# Patient Record
Sex: FEMALE | Race: WHITE | NOT HISPANIC OR LATINO
[De-identification: names, ages, dates, MRNs, and addresses within clinical notes are randomized per-mention and may not be internally consistent; named-entity substitution may affect disease eponyms.]

---

## 2017-06-02 ENCOUNTER — APPOINTMENT (OUTPATIENT)
Dept: CT IMAGING | Facility: CLINIC | Age: 62
End: 2017-06-02

## 2017-06-02 ENCOUNTER — OUTPATIENT (OUTPATIENT)
Dept: OUTPATIENT SERVICES | Facility: HOSPITAL | Age: 62
LOS: 1 days | End: 2017-06-02
Payer: COMMERCIAL

## 2017-06-02 DIAGNOSIS — Z00.8 ENCOUNTER FOR OTHER GENERAL EXAMINATION: ICD-10-CM

## 2017-06-02 DIAGNOSIS — Z90.710 ACQUIRED ABSENCE OF BOTH CERVIX AND UTERUS: Chronic | ICD-10-CM

## 2017-06-02 DIAGNOSIS — Z98.89 OTHER SPECIFIED POSTPROCEDURAL STATES: Chronic | ICD-10-CM

## 2017-06-02 PROCEDURE — 74178 CT ABD&PLV WO CNTR FLWD CNTR: CPT

## 2017-06-02 PROCEDURE — 82565 ASSAY OF CREATININE: CPT

## 2017-07-21 ENCOUNTER — APPOINTMENT (OUTPATIENT)
Dept: MRI IMAGING | Facility: CLINIC | Age: 62
End: 2017-07-21

## 2017-07-21 ENCOUNTER — OUTPATIENT (OUTPATIENT)
Dept: OUTPATIENT SERVICES | Facility: HOSPITAL | Age: 62
LOS: 1 days | End: 2017-07-21
Payer: COMMERCIAL

## 2017-07-21 DIAGNOSIS — R10.11 RIGHT UPPER QUADRANT PAIN: ICD-10-CM

## 2017-07-21 DIAGNOSIS — Z98.89 OTHER SPECIFIED POSTPROCEDURAL STATES: Chronic | ICD-10-CM

## 2017-07-21 DIAGNOSIS — R11.2 NAUSEA WITH VOMITING, UNSPECIFIED: ICD-10-CM

## 2017-07-21 DIAGNOSIS — Z90.710 ACQUIRED ABSENCE OF BOTH CERVIX AND UTERUS: Chronic | ICD-10-CM

## 2017-07-21 DIAGNOSIS — Z00.8 ENCOUNTER FOR OTHER GENERAL EXAMINATION: ICD-10-CM

## 2017-07-21 PROCEDURE — 74183 MRI ABD W/O CNTR FLWD CNTR: CPT

## 2017-07-21 PROCEDURE — 72197 MRI PELVIS W/O & W/DYE: CPT

## 2017-07-21 PROCEDURE — 82565 ASSAY OF CREATININE: CPT

## 2017-07-21 PROCEDURE — A9585: CPT

## 2017-08-24 ENCOUNTER — APPOINTMENT (OUTPATIENT)
Dept: MAMMOGRAPHY | Facility: CLINIC | Age: 62
End: 2017-08-24
Payer: COMMERCIAL

## 2017-08-24 ENCOUNTER — APPOINTMENT (OUTPATIENT)
Dept: RADIOLOGY | Facility: CLINIC | Age: 62
End: 2017-08-24
Payer: COMMERCIAL

## 2017-08-24 ENCOUNTER — OUTPATIENT (OUTPATIENT)
Dept: OUTPATIENT SERVICES | Facility: HOSPITAL | Age: 62
LOS: 1 days | End: 2017-08-24
Payer: COMMERCIAL

## 2017-08-24 DIAGNOSIS — Z98.89 OTHER SPECIFIED POSTPROCEDURAL STATES: Chronic | ICD-10-CM

## 2017-08-24 DIAGNOSIS — Z90.710 ACQUIRED ABSENCE OF BOTH CERVIX AND UTERUS: Chronic | ICD-10-CM

## 2017-08-24 DIAGNOSIS — Z00.8 ENCOUNTER FOR OTHER GENERAL EXAMINATION: ICD-10-CM

## 2017-08-24 PROCEDURE — G0202: CPT | Mod: 26

## 2017-08-24 PROCEDURE — 77067 SCR MAMMO BI INCL CAD: CPT

## 2017-08-24 PROCEDURE — 77080 DXA BONE DENSITY AXIAL: CPT

## 2017-08-24 PROCEDURE — 77063 BREAST TOMOSYNTHESIS BI: CPT | Mod: 26

## 2017-08-24 PROCEDURE — 77080 DXA BONE DENSITY AXIAL: CPT | Mod: 26

## 2017-08-24 PROCEDURE — 77063 BREAST TOMOSYNTHESIS BI: CPT

## 2018-02-26 ENCOUNTER — RESULT REVIEW (OUTPATIENT)
Age: 63
End: 2018-02-26

## 2018-02-26 ENCOUNTER — OUTPATIENT (OUTPATIENT)
Dept: OUTPATIENT SERVICES | Facility: HOSPITAL | Age: 63
LOS: 1 days | End: 2018-02-26
Payer: COMMERCIAL

## 2018-02-26 ENCOUNTER — TRANSCRIPTION ENCOUNTER (OUTPATIENT)
Age: 63
End: 2018-02-26

## 2018-02-26 DIAGNOSIS — Z98.89 OTHER SPECIFIED POSTPROCEDURAL STATES: Chronic | ICD-10-CM

## 2018-02-26 DIAGNOSIS — Z90.710 ACQUIRED ABSENCE OF BOTH CERVIX AND UTERUS: Chronic | ICD-10-CM

## 2018-02-26 DIAGNOSIS — Z12.11 ENCOUNTER FOR SCREENING FOR MALIGNANT NEOPLASM OF COLON: ICD-10-CM

## 2018-02-26 DIAGNOSIS — R13.10 DYSPHAGIA, UNSPECIFIED: ICD-10-CM

## 2018-02-26 PROCEDURE — 88312 SPECIAL STAINS GROUP 1: CPT | Mod: 26

## 2018-02-26 PROCEDURE — 88305 TISSUE EXAM BY PATHOLOGIST: CPT

## 2018-02-26 PROCEDURE — 45380 COLONOSCOPY AND BIOPSY: CPT | Mod: PT

## 2018-02-26 PROCEDURE — 88312 SPECIAL STAINS GROUP 1: CPT

## 2018-02-26 PROCEDURE — 43239 EGD BIOPSY SINGLE/MULTIPLE: CPT

## 2018-02-26 PROCEDURE — 88305 TISSUE EXAM BY PATHOLOGIST: CPT | Mod: 26

## 2019-09-09 ENCOUNTER — APPOINTMENT (OUTPATIENT)
Dept: MAMMOGRAPHY | Facility: CLINIC | Age: 64
End: 2019-09-09

## 2019-10-22 ENCOUNTER — OUTPATIENT (OUTPATIENT)
Dept: OUTPATIENT SERVICES | Facility: HOSPITAL | Age: 64
LOS: 1 days | End: 2019-10-22
Payer: COMMERCIAL

## 2019-10-22 ENCOUNTER — APPOINTMENT (OUTPATIENT)
Dept: MRI IMAGING | Facility: CLINIC | Age: 64
End: 2019-10-22
Payer: COMMERCIAL

## 2019-10-22 DIAGNOSIS — Z98.89 OTHER SPECIFIED POSTPROCEDURAL STATES: Chronic | ICD-10-CM

## 2019-10-22 DIAGNOSIS — Z00.8 ENCOUNTER FOR OTHER GENERAL EXAMINATION: ICD-10-CM

## 2019-10-22 DIAGNOSIS — Z90.710 ACQUIRED ABSENCE OF BOTH CERVIX AND UTERUS: Chronic | ICD-10-CM

## 2019-10-22 PROCEDURE — 74183 MRI ABD W/O CNTR FLWD CNTR: CPT | Mod: 26

## 2019-10-22 PROCEDURE — A9585: CPT

## 2019-10-22 PROCEDURE — 74183 MRI ABD W/O CNTR FLWD CNTR: CPT

## 2019-10-24 ENCOUNTER — APPOINTMENT (OUTPATIENT)
Dept: RADIOLOGY | Facility: CLINIC | Age: 64
End: 2019-10-24
Payer: COMMERCIAL

## 2019-10-24 ENCOUNTER — OUTPATIENT (OUTPATIENT)
Dept: OUTPATIENT SERVICES | Facility: HOSPITAL | Age: 64
LOS: 1 days | End: 2019-10-24
Payer: COMMERCIAL

## 2019-10-24 ENCOUNTER — APPOINTMENT (OUTPATIENT)
Dept: MAMMOGRAPHY | Facility: CLINIC | Age: 64
End: 2019-10-24
Payer: COMMERCIAL

## 2019-10-24 DIAGNOSIS — Z98.89 OTHER SPECIFIED POSTPROCEDURAL STATES: Chronic | ICD-10-CM

## 2019-10-24 DIAGNOSIS — Z90.710 ACQUIRED ABSENCE OF BOTH CERVIX AND UTERUS: Chronic | ICD-10-CM

## 2019-10-24 DIAGNOSIS — Z12.31 ENCOUNTER FOR SCREENING MAMMOGRAM FOR MALIGNANT NEOPLASM OF BREAST: ICD-10-CM

## 2019-10-24 PROCEDURE — 77063 BREAST TOMOSYNTHESIS BI: CPT | Mod: 26

## 2019-10-24 PROCEDURE — 77080 DXA BONE DENSITY AXIAL: CPT | Mod: 26

## 2019-10-24 PROCEDURE — 77080 DXA BONE DENSITY AXIAL: CPT

## 2019-10-24 PROCEDURE — 77067 SCR MAMMO BI INCL CAD: CPT | Mod: 26

## 2019-10-24 PROCEDURE — 77063 BREAST TOMOSYNTHESIS BI: CPT

## 2019-10-24 PROCEDURE — 77067 SCR MAMMO BI INCL CAD: CPT

## 2019-12-07 ENCOUNTER — INPATIENT (INPATIENT)
Facility: HOSPITAL | Age: 64
LOS: 2 days | Discharge: HOME | DRG: 247 | End: 2019-12-10
Attending: STUDENT IN AN ORGANIZED HEALTH CARE EDUCATION/TRAINING PROGRAM | Admitting: INTERNAL MEDICINE
Payer: COMMERCIAL

## 2019-12-07 VITALS
RESPIRATION RATE: 26 BRPM | DIASTOLIC BLOOD PRESSURE: 93 MMHG | HEART RATE: 83 BPM | OXYGEN SATURATION: 100 % | SYSTOLIC BLOOD PRESSURE: 147 MMHG

## 2019-12-07 DIAGNOSIS — Z98.89 OTHER SPECIFIED POSTPROCEDURAL STATES: Chronic | ICD-10-CM

## 2019-12-07 DIAGNOSIS — I21.4 NON-ST ELEVATION (NSTEMI) MYOCARDIAL INFARCTION: ICD-10-CM

## 2019-12-07 DIAGNOSIS — Z90.710 ACQUIRED ABSENCE OF BOTH CERVIX AND UTERUS: Chronic | ICD-10-CM

## 2019-12-07 LAB
ALBUMIN SERPL ELPH-MCNC: 3.4 G/DL — SIGNIFICANT CHANGE UP (ref 3.3–5)
ALBUMIN SERPL ELPH-MCNC: 3.8 G/DL — SIGNIFICANT CHANGE UP (ref 3.3–5)
ALP SERPL-CCNC: 76 U/L — SIGNIFICANT CHANGE UP (ref 40–120)
ALP SERPL-CCNC: 79 U/L — SIGNIFICANT CHANGE UP (ref 40–120)
ALT FLD-CCNC: 14 U/L — SIGNIFICANT CHANGE UP (ref 10–45)
ALT FLD-CCNC: 19 U/L — SIGNIFICANT CHANGE UP (ref 10–45)
ANION GAP SERPL CALC-SCNC: 12 MMOL/L — SIGNIFICANT CHANGE UP (ref 5–17)
ANION GAP SERPL CALC-SCNC: 13 MMOL/L — SIGNIFICANT CHANGE UP (ref 5–17)
APTT BLD: 85.8 SEC — HIGH (ref 27.5–36.3)
AST SERPL-CCNC: 22 U/L — SIGNIFICANT CHANGE UP (ref 10–40)
AST SERPL-CCNC: 52 U/L — HIGH (ref 10–40)
BILIRUB SERPL-MCNC: 0.5 MG/DL — SIGNIFICANT CHANGE UP (ref 0.2–1.2)
BILIRUB SERPL-MCNC: 0.6 MG/DL — SIGNIFICANT CHANGE UP (ref 0.2–1.2)
BUN SERPL-MCNC: 11 MG/DL — SIGNIFICANT CHANGE UP (ref 7–23)
BUN SERPL-MCNC: 12 MG/DL — SIGNIFICANT CHANGE UP (ref 7–23)
CALCIUM SERPL-MCNC: 8.8 MG/DL — SIGNIFICANT CHANGE UP (ref 8.4–10.5)
CALCIUM SERPL-MCNC: 9.4 MG/DL — SIGNIFICANT CHANGE UP (ref 8.4–10.5)
CHLORIDE SERPL-SCNC: 105 MMOL/L — SIGNIFICANT CHANGE UP (ref 96–108)
CHLORIDE SERPL-SCNC: 105 MMOL/L — SIGNIFICANT CHANGE UP (ref 96–108)
CK MB BLD-MCNC: 10.5 % — HIGH (ref 0–3.5)
CK MB CFR SERPL CALC: 11.9 NG/ML — HIGH (ref 0–3.8)
CK SERPL-CCNC: 113 U/L — SIGNIFICANT CHANGE UP (ref 25–170)
CO2 SERPL-SCNC: 21 MMOL/L — LOW (ref 22–31)
CO2 SERPL-SCNC: 22 MMOL/L — SIGNIFICANT CHANGE UP (ref 22–31)
CREAT SERPL-MCNC: 0.57 MG/DL — SIGNIFICANT CHANGE UP (ref 0.5–1.3)
CREAT SERPL-MCNC: 0.59 MG/DL — SIGNIFICANT CHANGE UP (ref 0.5–1.3)
GLUCOSE SERPL-MCNC: 105 MG/DL — HIGH (ref 70–99)
GLUCOSE SERPL-MCNC: 137 MG/DL — HIGH (ref 70–99)
HCT VFR BLD CALC: 38.2 % — SIGNIFICANT CHANGE UP (ref 34.5–45)
HCT VFR BLD CALC: 42.3 % — SIGNIFICANT CHANGE UP (ref 34.5–45)
HGB BLD-MCNC: 13.1 G/DL — SIGNIFICANT CHANGE UP (ref 11.5–15.5)
HGB BLD-MCNC: 14.3 G/DL — SIGNIFICANT CHANGE UP (ref 11.5–15.5)
INR BLD: 1.07 RATIO — SIGNIFICANT CHANGE UP (ref 0.88–1.16)
LACTATE SERPL-SCNC: 0.9 MMOL/L — SIGNIFICANT CHANGE UP (ref 0.7–2)
MAGNESIUM SERPL-MCNC: 1.9 MG/DL — SIGNIFICANT CHANGE UP (ref 1.6–2.6)
MAGNESIUM SERPL-MCNC: 2.2 MG/DL — SIGNIFICANT CHANGE UP (ref 1.6–2.6)
MCHC RBC-ENTMCNC: 30.8 PG — SIGNIFICANT CHANGE UP (ref 27–34)
MCHC RBC-ENTMCNC: 31.4 PG — SIGNIFICANT CHANGE UP (ref 27–34)
MCHC RBC-ENTMCNC: 33.8 GM/DL — SIGNIFICANT CHANGE UP (ref 32–36)
MCHC RBC-ENTMCNC: 34.3 GM/DL — SIGNIFICANT CHANGE UP (ref 32–36)
MCV RBC AUTO: 91.2 FL — SIGNIFICANT CHANGE UP (ref 80–100)
MCV RBC AUTO: 91.6 FL — SIGNIFICANT CHANGE UP (ref 80–100)
NRBC # BLD: 0 /100 WBCS — SIGNIFICANT CHANGE UP (ref 0–0)
NRBC # BLD: 0 /100 WBCS — SIGNIFICANT CHANGE UP (ref 0–0)
NT-PROBNP SERPL-SCNC: 185 PG/ML — SIGNIFICANT CHANGE UP (ref 0–300)
NT-PROBNP SERPL-SCNC: 295 PG/ML — SIGNIFICANT CHANGE UP (ref 0–300)
PHOSPHATE SERPL-MCNC: 3.3 MG/DL — SIGNIFICANT CHANGE UP (ref 2.5–4.5)
PLATELET # BLD AUTO: 246 K/UL — SIGNIFICANT CHANGE UP (ref 150–400)
PLATELET # BLD AUTO: 299 K/UL — SIGNIFICANT CHANGE UP (ref 150–400)
POTASSIUM SERPL-MCNC: 3.5 MMOL/L — SIGNIFICANT CHANGE UP (ref 3.5–5.3)
POTASSIUM SERPL-MCNC: 6.1 MMOL/L — HIGH (ref 3.5–5.3)
POTASSIUM SERPL-SCNC: 3.5 MMOL/L — SIGNIFICANT CHANGE UP (ref 3.5–5.3)
POTASSIUM SERPL-SCNC: 6.1 MMOL/L — HIGH (ref 3.5–5.3)
PROT SERPL-MCNC: 6.2 G/DL — SIGNIFICANT CHANGE UP (ref 6–8.3)
PROT SERPL-MCNC: 7.5 G/DL — SIGNIFICANT CHANGE UP (ref 6–8.3)
PROTHROM AB SERPL-ACNC: 12.2 SEC — SIGNIFICANT CHANGE UP (ref 10–12.9)
RBC # BLD: 4.17 M/UL — SIGNIFICANT CHANGE UP (ref 3.8–5.2)
RBC # BLD: 4.64 M/UL — SIGNIFICANT CHANGE UP (ref 3.8–5.2)
RBC # FLD: 11.9 % — SIGNIFICANT CHANGE UP (ref 10.3–14.5)
RBC # FLD: 12 % — SIGNIFICANT CHANGE UP (ref 10.3–14.5)
SODIUM SERPL-SCNC: 139 MMOL/L — SIGNIFICANT CHANGE UP (ref 135–145)
SODIUM SERPL-SCNC: 139 MMOL/L — SIGNIFICANT CHANGE UP (ref 135–145)
TROPONIN T, HIGH SENSITIVITY RESULT: 256 NG/L — HIGH (ref 0–51)
TROPONIN T, HIGH SENSITIVITY RESULT: 299 NG/L — HIGH (ref 0–51)
WBC # BLD: 7.52 K/UL — SIGNIFICANT CHANGE UP (ref 3.8–10.5)
WBC # BLD: 9.14 K/UL — SIGNIFICANT CHANGE UP (ref 3.8–10.5)
WBC # FLD AUTO: 7.52 K/UL — SIGNIFICANT CHANGE UP (ref 3.8–10.5)
WBC # FLD AUTO: 9.14 K/UL — SIGNIFICANT CHANGE UP (ref 3.8–10.5)

## 2019-12-07 PROCEDURE — 71045 X-RAY EXAM CHEST 1 VIEW: CPT | Mod: 26

## 2019-12-07 PROCEDURE — 93010 ELECTROCARDIOGRAM REPORT: CPT

## 2019-12-07 PROCEDURE — 99291 CRITICAL CARE FIRST HOUR: CPT

## 2019-12-07 RX ORDER — PANTOPRAZOLE SODIUM 20 MG/1
40 TABLET, DELAYED RELEASE ORAL
Refills: 0 | Status: DISCONTINUED | OUTPATIENT
Start: 2019-12-07 | End: 2019-12-10

## 2019-12-07 RX ORDER — ACETAMINOPHEN 500 MG
975 TABLET ORAL ONCE
Refills: 0 | Status: COMPLETED | OUTPATIENT
Start: 2019-12-07 | End: 2019-12-07

## 2019-12-07 RX ORDER — ASPIRIN/CALCIUM CARB/MAGNESIUM 324 MG
81 TABLET ORAL DAILY
Refills: 0 | Status: DISCONTINUED | OUTPATIENT
Start: 2019-12-08 | End: 2019-12-10

## 2019-12-07 RX ORDER — CHLORHEXIDINE GLUCONATE 213 G/1000ML
1 SOLUTION TOPICAL
Refills: 0 | Status: DISCONTINUED | OUTPATIENT
Start: 2019-12-07 | End: 2019-12-10

## 2019-12-07 RX ORDER — HEPARIN SODIUM 5000 [USP'U]/ML
900 INJECTION INTRAVENOUS; SUBCUTANEOUS
Qty: 25000 | Refills: 0 | Status: DISCONTINUED | OUTPATIENT
Start: 2019-12-07 | End: 2019-12-08

## 2019-12-07 RX ORDER — ATORVASTATIN CALCIUM 80 MG/1
80 TABLET, FILM COATED ORAL AT BEDTIME
Refills: 0 | Status: DISCONTINUED | OUTPATIENT
Start: 2019-12-07 | End: 2019-12-10

## 2019-12-07 RX ORDER — ISOSORBIDE DINITRATE 5 MG/1
10 TABLET ORAL THREE TIMES A DAY
Refills: 0 | Status: ACTIVE | OUTPATIENT
Start: 2019-12-07 | End: 2020-11-04

## 2019-12-07 RX ORDER — NITROGLYCERIN 6.5 MG
20 CAPSULE, EXTENDED RELEASE ORAL
Qty: 50 | Refills: 0 | Status: DISCONTINUED | OUTPATIENT
Start: 2019-12-07 | End: 2019-12-07

## 2019-12-07 RX ORDER — CLOPIDOGREL BISULFATE 75 MG/1
75 TABLET, FILM COATED ORAL DAILY
Refills: 0 | Status: DISCONTINUED | OUTPATIENT
Start: 2019-12-08 | End: 2019-12-10

## 2019-12-07 RX ORDER — HEPARIN SODIUM 5000 [USP'U]/ML
4000 INJECTION INTRAVENOUS; SUBCUTANEOUS EVERY 6 HOURS
Refills: 0 | Status: DISCONTINUED | OUTPATIENT
Start: 2019-12-07 | End: 2019-12-09

## 2019-12-07 RX ADMIN — Medication 975 MILLIGRAM(S): at 22:28

## 2019-12-07 RX ADMIN — Medication 3 MICROGRAM(S)/MIN: at 17:12

## 2019-12-07 RX ADMIN — HEPARIN SODIUM 900 UNIT(S)/HR: 5000 INJECTION INTRAVENOUS; SUBCUTANEOUS at 16:53

## 2019-12-07 RX ADMIN — Medication 975 MILLIGRAM(S): at 23:00

## 2019-12-07 RX ADMIN — ISOSORBIDE DINITRATE 10 MILLIGRAM(S): 5 TABLET ORAL at 23:16

## 2019-12-07 NOTE — H&P ADULT - ASSESSMENT
64F Hx of strong family h/o early CAD, borderline HTN, IBS, GERD, hernia s/p repair w/ mesh, known lipomas on pancreatis/liver; transferred from [facility] for NSTEMI briefly requiring Nitro gtt, pending [facility]. 64F Hx of strong family h/o early CAD, borderline HTN, IBS, GERD, hernia s/p repair w/ mesh, known lipomas on pancreatis/liver; transferred from [facility] for NSTEMI briefly requiring Nitro gtt, pending ischemic work up.

## 2019-12-07 NOTE — H&P ADULT - HISTORY OF PRESENT ILLNESS
This is a 64 year old female with Hx of strong family h/o early CAD, borderline HTN (not on medications), IBS, GERD, hernia s/p repair w/ mesh, known lipomas on pancreatis and liver for which patient is followed closely with yearly MRI scans who presents to [facility] today with c/o "Midsternal 8/10 sharp continuos chest pain radiating to neck and upper back" not associated with any other factors.  While in ED, noted to have elevated CE.  ACS protocol initiated with Heparin gtt s/p ASA/Plavix load.  Patient transferred to [facility] for potential LHC.  On arrival, c/o 3-4/10 MSCP dull for which a Nitro gtt was started, gtt currently at 20mcg, patient now denies pain.  Denies fever/chills, sob, now cp, palpitations, abd pain, n/v/c.  Patient underwent a LHC 5/2016 in setting of unstable angina which revealed normal coronaries, LV gram EF 55%. This is a 64 year old female with Hx of strong family h/o early CAD, borderline HTN (not on medications), IBS, GERD, hiatal hernia s/p repair w/ mesh, known lipomas on pancreatis & liver for which patient is followed closely with yearly MRI scans who presents to [facility] today with c/o "midsternal 8/10 sharp continuos chest pain radiating to neck and upper back" not associated with any other factors.  While in ED, noted to have elevated CE, no EKG changes.  ACS protocol initiated with Heparin gtt s/p ASA/Plavix load.  Patient transferred to [facility] for potential LHC.  On arrival, c/o 3-4/10 MSCP dull for which a Nitro gtt was started, gtt currently at 20mcg, patient now denies pain.  Denies fever/chills, sob, now cp free, palpitations, abd pain, n/v/c.  Patient underwent a LHC 5/2016 in setting of unstable angina which revealed normal coronaries, LV gram EF 55%. This is a 64 year old female with Hx of strong family h/o early CAD, borderline HTN (not on medications), IBS, GERD, hiatal hernia s/p repair w/ mesh, known lipomas on pancreatis & liver for which patient is followed closely with yearly MRI scans who presents to [facility] today with c/o "midsternal 8/10 sharp continuos chest pain radiating to neck and upper back" x few hours, not associated with any other factors.  While in ED, noted to have elevated CE, no EKG changes.  ACS protocol initiated with Heparin gtt s/p ASA/Plavix load.  Patient transferred to [facility] for potential LHC.  On arrival, c/o 3-4/10 MSCP dull for which a Nitro gtt was started, gtt currently at 20mcg, patient now denies pain.  Denies fever/chills, sob, now cp free, palpitations, abd pain, n/v/c.  Patient underwent a LHC 5/2016 in setting of unstable angina which revealed normal coronaries, LV gram EF 55%. This is a 64 year old female with Hx of strong family h/o early CAD, borderline HTN (not on medications), IBS, GERD, hiatal hernia s/p repair w/ mesh, known lipomas on pancreatis & liver for which patient is followed closely with yearly MRI scans who presents to [facility] today with c/o "midsternal 8/10 sharp continuos chest pain radiating to neck and upper back" x few hours, not associated with any other factors.  While in ED, noted to have elevated CE, no EKG changes at that time.  ACS protocol initiated with Heparin gtt s/p ASA/Plavix load.  Patient transferred to [facility] for potential LHC.  On arrival, c/o 3-4/10 MSCP dull for which a Nitro gtt was started, gtt currently at 20mcg, patient now denies pain.  Denies fever/chills, sob, now cp free, palpitations, abd pain, n/v/c.  Patient underwent a LHC 5/2016 in setting of unstable angina which revealed normal coronaries, LV gram EF 55%.  EKG w/ inferiorlateral wall TWIs, no ST elevations or depressions.

## 2019-12-07 NOTE — H&P ADULT - NSICDXFAMILYHX_GEN_ALL_CORE_FT
FAMILY HISTORY:  Family history of atrial fibrillation  Family history of brain tumor  Family history of hypertension    Sibling  Still living? No  Family history of acute myocardial infarction, Age at diagnosis: Age Unknown

## 2019-12-07 NOTE — ED PROVIDER NOTE - ATTENDING CONTRIBUTION TO CARE
Attending MD Guerrero:  I personally have seen and examined this patient.  Resident note reviewed and agree on plan of care and except where noted.  See HPI, PE, and MDM for details.

## 2019-12-07 NOTE — ED ADULT NURSE NOTE - OBJECTIVE STATEMENT
64 yr old female transfer from [facility] (initially presented for midsternal chest pain, belching), found: +NSTEMI with normal troponin levels, was given asa 81 mg x 2, plavix 600 mg, at present, still with persistent chest discomfort, (denies dizziness/nausea/vomiting), clear lungs, skin wdi, heparin gtt infusing (81428 in 500 ml) upon arrival to ED (changed to [facility] heparin gtt concentration 2500 in 250 ml), +ambulatory, +voiding, vitals stable, afebrile, no pedal edema noted, seen by cardiology resident shortly after arrival to ED, +nonsmoker, +social drinker

## 2019-12-07 NOTE — H&P ADULT - NSHPPHYSICALEXAM_GEN_ALL_CORE
General: WN/WD NAD  Neurology: A&Ox3, nonfocal, CHAPIN x 4  Respiratory: CTA B/L, no wheezing   CV: RRR, S1S2, no murmurs, rubs or gallops  Abdominal: Soft, NT, ND, normoactive BS  Extremities: No edema, + peripheral pulses

## 2019-12-07 NOTE — ED PROVIDER NOTE - NS ED ROS FT
CONST: no fevers, no chills  EYES: no pain, no vision changes  ENT: no sore throat, no ear pain, no change in hearing, +jaw pain  CV: +chest pain, no leg swelling  RESP: no shortness of breath, no cough  ABD: no abdominal pain, no nausea, no vomiting, no diarrhea  : no dysuria, no flank pain, no hematuria  MSK: no back pain, no extremity pain  NEURO: no headache or additional neurologic complaints  HEME: no easy bleeding  SKIN:  no rash

## 2019-12-07 NOTE — H&P ADULT - NSICDXPASTMEDICALHX_GEN_ALL_CORE_FT
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

## 2019-12-07 NOTE — ED PROVIDER NOTE - CLINICAL SUMMARY MEDICAL DECISION MAKING FREE TEXT BOX
Attending MD Guerrero: 64F transferred from OSH with NSTEMI, s/p ASA and plavix loading doses and heparin gtt, still with mild active chest pain. ECG on arrival with TWI V3-V5, no ST elevations or depressions. Cath consult called given ongoing pain, continue medical management with heparin gtt and add low dose nitro gtt to attempt pain control pending cath consultation.

## 2019-12-07 NOTE — ED PROVIDER NOTE - PMH
Back pain, chronic  history of lumbar compression fracture  Diaphragmatic hernia    GERD (gastroesophageal reflux disease)    Irritable bowel syndrome with diarrhea    Lipoma of intra-abdominal organ  In pancreas and liver  Thyroid nodule

## 2019-12-07 NOTE — H&P ADULT - NSHPLABSRESULTS_GEN_ALL_CORE
====================  VITALS (Last 12 hrs):  ====================  T(C): 36.3 (12-07-19 @ 21:50), Max: 36.8 (12-07-19 @ 17:13)  T(F): 97.4 (12-07-19 @ 21:50), Max: 98.2 (12-07-19 @ 17:13)  HR: 63 (12-07-19 @ 22:05) (61 - 83)  BP: 161/83 (12-07-19 @ 22:05) (129/78 - 170/103)  BP(mean): 117 (12-07-19 @ 22:05) (117 - 130)  RR: 27 (12-07-19 @ 22:05) (16 - 27)  SpO2: 99% (12-07-19 @ 22:05) (97% - 100%)    ====================  NEW LABS:  ====================  12-07    139  |  105  |  12  ----------------------------<  105<H>  6.1<H>   |  21<L>  |  0.57    Ca    9.4      07 Dec 2019 17:23  Mg     2.2     12-07    TPro  7.5  /  Alb  3.8  /  TBili  0.6  /  DBili  x   /  AST  52<H>  /  ALT  19  /  AlkPhos  79  12-07    PT/INR - ( 07 Dec 2019 17:23 )   PT: 12.2 sec;   INR: 1.07 ratio      PTT - ( 07 Dec 2019 17:23 )  PTT:85.8 sec

## 2019-12-07 NOTE — ED ADULT NURSE REASSESSMENT NOTE - NS ED NURSE REASSESS COMMENT FT1
pt still with 3/10 chest pain, unchanged from starting nitro gtt, nitro gtt titrated up to 20 mcg/min - per attending order, heparin infusing, family present

## 2019-12-07 NOTE — H&P ADULT - NSICDXPASTSURGICALHX_GEN_ALL_CORE_FT
PAST SURGICAL HISTORY:  H/O abdominal hysterectomy     History of cholecystectomy     History of lumpectomy bilateral    History of Nissen fundoplication

## 2019-12-07 NOTE — ED PROVIDER NOTE - OBJECTIVE STATEMENT
64F no known PMHX presents as transfer from OSH for NSTEMI. Initially presented to [facility] for jaw pain which radiated down to her chest, crushing pressure. Patient took 600mg of ibuprofen for the jaw pain, but was concerned about cehst pain. No history of anginal symptoms, had cath many years ago but never got stents. Denies any fevers, chills, N/V/D. Does note some increased belching since the chest pain started. At [facility], initial trop was neg. but then 3 hours was 0.77. Was given 162 ASA, 600 plavix load, heparin drip, sublingual nitro and nitro paste. Pain here is 3/10.

## 2019-12-07 NOTE — ED ADULT NURSE NOTE - PSH
H/O abdominal hysterectomy    History of cholecystectomy    History of lumpectomy  bilateral  History of Nissen fundoplication

## 2019-12-07 NOTE — H&P ADULT - PROBLEM SELECTOR PLAN 1
DAPT (ASA/Plavix)  Consider Brilinta   High intensity stain   Obtain TTE in AM  Trend CE, send BNP  BMP / Mag / Phos   TSH / A1c / Lipid   Ischemic work up DAPT (ASA/Plavix)  Consider Brilinta   High intensity stain   Obtain TTE in AM  Trend CE, send BNP  BMP / Mag / Phos   TSH / A1c / Lipid   Possible GI etiology  Start PPI for now  Ischemic work up DAPT (ASA/Plavix)  Consider Brilinta   High intensity stain   Titrated off Nitro gtt   Start Isordil 10mg TID  Hold BB (sinus monty)  Obtain TTE in AM  Trend CE, send BNP  BMP / Mag / Phos   TSH / A1c / Lipid   Possible GI etiology  Start PPI for now  Ischemic work up

## 2019-12-07 NOTE — ED ADULT NURSE REASSESSMENT NOTE - NS ED NURSE REASSESS COMMENT FT1
Received report from           RN. Patient resting comfortably in stretcher. A&Ox4. Vital signs are stable. Patienti no acute distress. Patient pending         .Plan of care discussed. Safety and comfort measures maintained. Will continue to monitor.

## 2019-12-07 NOTE — ED PROVIDER NOTE - CRITICAL CARE PROVIDED
direct patient care (not related to procedure)/interpretation of diagnostic studies/additional history taking/documentation/consultation with other physicians

## 2019-12-08 LAB
ALBUMIN SERPL ELPH-MCNC: 3.5 G/DL — SIGNIFICANT CHANGE UP (ref 3.3–5)
ALP SERPL-CCNC: 83 U/L — SIGNIFICANT CHANGE UP (ref 40–120)
ALT FLD-CCNC: 17 U/L — SIGNIFICANT CHANGE UP (ref 10–45)
ANION GAP SERPL CALC-SCNC: 12 MMOL/L — SIGNIFICANT CHANGE UP (ref 5–17)
ANION GAP SERPL CALC-SCNC: 12 MMOL/L — SIGNIFICANT CHANGE UP (ref 5–17)
APTT BLD: 44.4 SEC — HIGH (ref 27.5–36.3)
APTT BLD: 47.7 SEC — HIGH (ref 27.5–36.3)
APTT BLD: 49.7 SEC — HIGH (ref 27.5–36.3)
APTT BLD: >200 SEC — CRITICAL HIGH (ref 27.5–36.3)
AST SERPL-CCNC: 24 U/L — SIGNIFICANT CHANGE UP (ref 10–40)
BASOPHILS # BLD AUTO: 0.1 K/UL — SIGNIFICANT CHANGE UP (ref 0–0.2)
BASOPHILS NFR BLD AUTO: 1.5 % — SIGNIFICANT CHANGE UP (ref 0–2)
BILIRUB SERPL-MCNC: 0.2 MG/DL — SIGNIFICANT CHANGE UP (ref 0.2–1.2)
BLD GP AB SCN SERPL QL: NEGATIVE — SIGNIFICANT CHANGE UP
BUN SERPL-MCNC: 10 MG/DL — SIGNIFICANT CHANGE UP (ref 7–23)
BUN SERPL-MCNC: 10 MG/DL — SIGNIFICANT CHANGE UP (ref 7–23)
CALCIUM SERPL-MCNC: 8.8 MG/DL — SIGNIFICANT CHANGE UP (ref 8.4–10.5)
CALCIUM SERPL-MCNC: 9.1 MG/DL — SIGNIFICANT CHANGE UP (ref 8.4–10.5)
CHLORIDE SERPL-SCNC: 105 MMOL/L — SIGNIFICANT CHANGE UP (ref 96–108)
CHLORIDE SERPL-SCNC: 106 MMOL/L — SIGNIFICANT CHANGE UP (ref 96–108)
CHOLEST SERPL-MCNC: 151 MG/DL — SIGNIFICANT CHANGE UP (ref 10–199)
CK MB CFR SERPL CALC: 9.7 NG/ML — HIGH (ref 0–3.8)
CK SERPL-CCNC: 96 U/L — SIGNIFICANT CHANGE UP (ref 25–170)
CO2 SERPL-SCNC: 22 MMOL/L — SIGNIFICANT CHANGE UP (ref 22–31)
CO2 SERPL-SCNC: 24 MMOL/L — SIGNIFICANT CHANGE UP (ref 22–31)
CREAT SERPL-MCNC: 0.6 MG/DL — SIGNIFICANT CHANGE UP (ref 0.5–1.3)
CREAT SERPL-MCNC: 0.68 MG/DL — SIGNIFICANT CHANGE UP (ref 0.5–1.3)
EOSINOPHIL # BLD AUTO: 0.34 K/UL — SIGNIFICANT CHANGE UP (ref 0–0.5)
EOSINOPHIL NFR BLD AUTO: 5 % — SIGNIFICANT CHANGE UP (ref 0–6)
GLUCOSE SERPL-MCNC: 126 MG/DL — HIGH (ref 70–99)
GLUCOSE SERPL-MCNC: 141 MG/DL — HIGH (ref 70–99)
HBA1C BLD-MCNC: 5.9 % — HIGH (ref 4–5.6)
HCT VFR BLD CALC: 40.1 % — SIGNIFICANT CHANGE UP (ref 34.5–45)
HCV AB S/CO SERPL IA: 0.24 S/CO — SIGNIFICANT CHANGE UP (ref 0–0.99)
HCV AB SERPL-IMP: SIGNIFICANT CHANGE UP
HDLC SERPL-MCNC: 54 MG/DL — SIGNIFICANT CHANGE UP
HGB BLD-MCNC: 13.1 G/DL — SIGNIFICANT CHANGE UP (ref 11.5–15.5)
IMM GRANULOCYTES NFR BLD AUTO: 0.1 % — SIGNIFICANT CHANGE UP (ref 0–1.5)
INR BLD: 0.98 RATIO — SIGNIFICANT CHANGE UP (ref 0.88–1.16)
INR BLD: 1.02 RATIO — SIGNIFICANT CHANGE UP (ref 0.88–1.16)
INR BLD: 1.05 RATIO — SIGNIFICANT CHANGE UP (ref 0.88–1.16)
LIPID PNL WITH DIRECT LDL SERPL: 82 MG/DL — SIGNIFICANT CHANGE UP
LYMPHOCYTES # BLD AUTO: 1.53 K/UL — SIGNIFICANT CHANGE UP (ref 1–3.3)
LYMPHOCYTES # BLD AUTO: 22.5 % — SIGNIFICANT CHANGE UP (ref 13–44)
MAGNESIUM SERPL-MCNC: 2.2 MG/DL — SIGNIFICANT CHANGE UP (ref 1.6–2.6)
MAGNESIUM SERPL-MCNC: 2.5 MG/DL — SIGNIFICANT CHANGE UP (ref 1.6–2.6)
MCHC RBC-ENTMCNC: 30.7 PG — SIGNIFICANT CHANGE UP (ref 27–34)
MCHC RBC-ENTMCNC: 32.7 GM/DL — SIGNIFICANT CHANGE UP (ref 32–36)
MCV RBC AUTO: 93.9 FL — SIGNIFICANT CHANGE UP (ref 80–100)
MONOCYTES # BLD AUTO: 0.61 K/UL — SIGNIFICANT CHANGE UP (ref 0–0.9)
MONOCYTES NFR BLD AUTO: 9 % — SIGNIFICANT CHANGE UP (ref 2–14)
NEUTROPHILS # BLD AUTO: 4.21 K/UL — SIGNIFICANT CHANGE UP (ref 1.8–7.4)
NEUTROPHILS NFR BLD AUTO: 61.9 % — SIGNIFICANT CHANGE UP (ref 43–77)
NRBC # BLD: 0 /100 WBCS — SIGNIFICANT CHANGE UP (ref 0–0)
PHOSPHATE SERPL-MCNC: 3.2 MG/DL — SIGNIFICANT CHANGE UP (ref 2.5–4.5)
PHOSPHATE SERPL-MCNC: 3.3 MG/DL — SIGNIFICANT CHANGE UP (ref 2.5–4.5)
PLATELET # BLD AUTO: 271 K/UL — SIGNIFICANT CHANGE UP (ref 150–400)
POTASSIUM SERPL-MCNC: 3.7 MMOL/L — SIGNIFICANT CHANGE UP (ref 3.5–5.3)
POTASSIUM SERPL-MCNC: 4.1 MMOL/L — SIGNIFICANT CHANGE UP (ref 3.5–5.3)
POTASSIUM SERPL-SCNC: 3.7 MMOL/L — SIGNIFICANT CHANGE UP (ref 3.5–5.3)
POTASSIUM SERPL-SCNC: 4.1 MMOL/L — SIGNIFICANT CHANGE UP (ref 3.5–5.3)
PROT SERPL-MCNC: 6.5 G/DL — SIGNIFICANT CHANGE UP (ref 6–8.3)
PROTHROM AB SERPL-ACNC: 11.3 SEC — SIGNIFICANT CHANGE UP (ref 10–12.9)
PROTHROM AB SERPL-ACNC: 11.7 SEC — SIGNIFICANT CHANGE UP (ref 10–12.9)
PROTHROM AB SERPL-ACNC: 12 SEC — SIGNIFICANT CHANGE UP (ref 10–12.9)
RBC # BLD: 4.27 M/UL — SIGNIFICANT CHANGE UP (ref 3.8–5.2)
RBC # FLD: 12.2 % — SIGNIFICANT CHANGE UP (ref 10.3–14.5)
RH IG SCN BLD-IMP: POSITIVE — SIGNIFICANT CHANGE UP
SODIUM SERPL-SCNC: 140 MMOL/L — SIGNIFICANT CHANGE UP (ref 135–145)
SODIUM SERPL-SCNC: 141 MMOL/L — SIGNIFICANT CHANGE UP (ref 135–145)
TOTAL CHOLESTEROL/HDL RATIO MEASUREMENT: 2.8 RATIO — LOW (ref 3.3–7.1)
TRIGL SERPL-MCNC: 75 MG/DL — SIGNIFICANT CHANGE UP (ref 10–149)
TROPONIN T, HIGH SENSITIVITY RESULT: 185 NG/L — HIGH (ref 0–51)
WBC # BLD: 6.8 K/UL — SIGNIFICANT CHANGE UP (ref 3.8–10.5)
WBC # FLD AUTO: 6.8 K/UL — SIGNIFICANT CHANGE UP (ref 3.8–10.5)

## 2019-12-08 PROCEDURE — 93306 TTE W/DOPPLER COMPLETE: CPT | Mod: 26

## 2019-12-08 PROCEDURE — 99233 SBSQ HOSP IP/OBS HIGH 50: CPT

## 2019-12-08 PROCEDURE — 93010 ELECTROCARDIOGRAM REPORT: CPT

## 2019-12-08 RX ORDER — HEPARIN SODIUM 5000 [USP'U]/ML
700 INJECTION INTRAVENOUS; SUBCUTANEOUS
Qty: 25000 | Refills: 0 | Status: DISCONTINUED | OUTPATIENT
Start: 2019-12-08 | End: 2019-12-09

## 2019-12-08 RX ORDER — MAGNESIUM SULFATE 500 MG/ML
1 VIAL (ML) INJECTION ONCE
Refills: 0 | Status: COMPLETED | OUTPATIENT
Start: 2019-12-08 | End: 2019-12-08

## 2019-12-08 RX ORDER — ACETAMINOPHEN 500 MG
650 TABLET ORAL ONCE
Refills: 0 | Status: COMPLETED | OUTPATIENT
Start: 2019-12-08 | End: 2019-12-08

## 2019-12-08 RX ORDER — POTASSIUM CHLORIDE 20 MEQ
40 PACKET (EA) ORAL ONCE
Refills: 0 | Status: COMPLETED | OUTPATIENT
Start: 2019-12-08 | End: 2019-12-08

## 2019-12-08 RX ADMIN — ISOSORBIDE DINITRATE 10 MILLIGRAM(S): 5 TABLET ORAL at 13:36

## 2019-12-08 RX ADMIN — CHLORHEXIDINE GLUCONATE 1 APPLICATION(S): 213 SOLUTION TOPICAL at 05:53

## 2019-12-08 RX ADMIN — Medication 100 GRAM(S): at 05:45

## 2019-12-08 RX ADMIN — Medication 650 MILLIGRAM(S): at 22:00

## 2019-12-08 RX ADMIN — Medication 650 MILLIGRAM(S): at 17:31

## 2019-12-08 RX ADMIN — Medication 40 MILLIEQUIVALENT(S): at 05:45

## 2019-12-08 RX ADMIN — Medication 81 MILLIGRAM(S): at 13:21

## 2019-12-08 RX ADMIN — HEPARIN SODIUM 0 UNIT(S)/HR: 5000 INJECTION INTRAVENOUS; SUBCUTANEOUS at 00:25

## 2019-12-08 RX ADMIN — ATORVASTATIN CALCIUM 80 MILLIGRAM(S): 80 TABLET, FILM COATED ORAL at 22:07

## 2019-12-08 RX ADMIN — Medication 650 MILLIGRAM(S): at 07:12

## 2019-12-08 RX ADMIN — ISOSORBIDE DINITRATE 10 MILLIGRAM(S): 5 TABLET ORAL at 22:07

## 2019-12-08 RX ADMIN — HEPARIN SODIUM 1000 UNIT(S)/HR: 5000 INJECTION INTRAVENOUS; SUBCUTANEOUS at 20:18

## 2019-12-08 RX ADMIN — ISOSORBIDE DINITRATE 10 MILLIGRAM(S): 5 TABLET ORAL at 05:45

## 2019-12-08 RX ADMIN — Medication 650 MILLIGRAM(S): at 08:41

## 2019-12-08 RX ADMIN — CLOPIDOGREL BISULFATE 75 MILLIGRAM(S): 75 TABLET, FILM COATED ORAL at 13:22

## 2019-12-08 RX ADMIN — Medication 650 MILLIGRAM(S): at 22:30

## 2019-12-08 RX ADMIN — Medication 650 MILLIGRAM(S): at 18:33

## 2019-12-08 RX ADMIN — HEPARIN SODIUM 850 UNIT(S)/HR: 5000 INJECTION INTRAVENOUS; SUBCUTANEOUS at 13:22

## 2019-12-08 RX ADMIN — PANTOPRAZOLE SODIUM 40 MILLIGRAM(S): 20 TABLET, DELAYED RELEASE ORAL at 05:45

## 2019-12-08 RX ADMIN — HEPARIN SODIUM 700 UNIT(S)/HR: 5000 INJECTION INTRAVENOUS; SUBCUTANEOUS at 07:12

## 2019-12-08 NOTE — PROGRESS NOTE ADULT - SUBJECTIVE AND OBJECTIVE BOX
====================  CCU MIDNIGHT ROUNDS  ====================    GRACIELA CABRERA    [id number]  Patient is a 64y old  Female who presents with a chief complaint of NSTEMI (08 Dec 2019 09:21)    ====================  SUMMARY: 64F Hx of strong family h/o early CAD, borderline HTN, IBS, GERD, hernia s/p repair w/ mesh, known lipomas on pancreatis/liver; transferred from [facility] for NSTEMI briefly requiring Nitro gtt, pending C in AM.  ====================    ====================  NEW EVENTS: Hemodynamically stable, denies chest pain.  S/P TTE: EF 62%, mild segmental LVSD, epex + apical inferior walls are akinetic, stage 1 diastolic dysfunction   ====================    MEDICATIONS  (STANDING):  aspirin enteric coated 81 milliGRAM(s) Oral daily  atorvastatin 80 milliGRAM(s) Oral at bedtime  chlorhexidine 2% Cloths 1 Application(s) Topical <User Schedule>  clopidogrel Tablet 75 milliGRAM(s) Oral daily  heparin  Infusion. 700 Unit(s)/Hr (7 mL/Hr) IV Continuous <Continuous>  isosorbide   dinitrate Tablet (ISORDIL) 10 milliGRAM(s) Oral three times a day  pantoprazole    Tablet 40 milliGRAM(s) Oral before breakfast    MEDICATIONS  (PRN):  heparin  Injectable 4000 Unit(s) IV Push every 6 hours PRN For aPTT less than 40    ====================  VITALS (Last 12 hrs):  ====================  T(C): 36.1 (12-08-19 @ 17:00), Max: 36.1 (12-08-19 @ 12:00)  T(F): 97 (12-08-19 @ 17:00), Max: 97 (12-08-19 @ 12:00)  HR: 65 (12-08-19 @ 20:00) (55 - 84)  BP: 138/82 (12-08-19 @ 20:00) (114/75 - 167/88)                              RR: 20 (12-08-19 @ 20:00) (17 - 27)  SpO2: 97% (12-08-19 @ 19:30) (97% - 98%)         I&O's Summary    07 Dec 2019 07:01  -  08 Dec 2019 07:00  --------------------------------------------------------  IN: 775 mL / OUT: 0 mL / NET: 775 mL    08 Dec 2019 07:01  -  08 Dec 2019 20:53  --------------------------------------------------------  IN: 821.5 mL / OUT: 0 mL / NET: 821.5 mL        ====================  NEW LABS:  ====================  12-08    140  |  106  |  10  ----------------------------<  126<H>  4.1   |  22  |  0.68    Ca    9.1      08 Dec 2019 19:42  Phos  3.3     12-08  Mg     2.2     12-08    TPro  6.5  /  Alb  3.5  /  TBili  0.2  /  DBili  x   /  AST  24  /  ALT  17  /  AlkPhos  83  12-08    PT/INR - ( 08 Dec 2019 19:42 )   PT: 11.3 sec;   INR: 0.98 ratio      PTT - ( 08 Dec 2019 19:42 )  PTT:49.7 sec  Creatine Kinase, Serum: 96 U/L (12-08-19 @ 06:52)  Creatine Kinase, Serum: 113 U/L (12-07-19 @ 23:01)    CKMB Units: 9.7 ng/mL (12-08 @ 06:52)  CKMB Units: 11.9 ng/mL (12-07 @ 23:01)      ====================  PLAN:  ====================  #NSTEMI  - TTE: EF 62%, akinetic apex & apical inferior walls   - DAPT (ASA/Plavix), if PCI required than Brilinta load   - High intensity stain, ISDN, hold BB due to NSB  - Remains off Nitro gtt, denies chest pain  - CE trended down  - [facility] in AM      Trista Larson CCU NP  Beeper #[id number]  Spectra # [id number]

## 2019-12-08 NOTE — PROGRESS NOTE ADULT - SUBJECTIVE AND OBJECTIVE BOX
- no acute overnight events   - chest pain resolved         Medications:  aspirin enteric coated 81 milliGRAM(s) Oral daily  atorvastatin 80 milliGRAM(s) Oral at bedtime  chlorhexidine 2% Cloths 1 Application(s) Topical <User Schedule>  clopidogrel Tablet 75 milliGRAM(s) Oral daily  heparin  Infusion. 700 Unit(s)/Hr IV Continuous <Continuous>  heparin  Injectable 4000 Unit(s) IV Push every 6 hours PRN  isosorbide   dinitrate Tablet (ISORDIL) 10 milliGRAM(s) Oral three times a day  pantoprazole    Tablet 40 milliGRAM(s) Oral before breakfast      PAST MEDICAL & SURGICAL HISTORY:  Thyroid nodule  Lipoma of intra-abdominal organ: In pancreas and liver  Irritable bowel syndrome with diarrhea  Back pain, chronic: history of lumbar compression fracture  GERD (gastroesophageal reflux disease)  Diaphragmatic hernia  History of Nissen fundoplication  History of lumpectomy: bilateral  History of cholecystectomy  H/O abdominal hysterectomy        Vitals:  T(F): 98.4 (-08), Max: 98.4 (12-08)  HR: 62 (-08) (48 - 83)  BP: 135/74 (12-08) (99/61 - 170/103)  RR: 22 (12-08)  SpO2: 97% (-)  I&O's Summary    07 Dec 2019 07:01  -  08 Dec 2019 07:00  --------------------------------------------------------  IN: 768 mL / OUT: 0 mL / NET: 768 mL        Physical Exam:  Appearance: No acute distress; well appearing  Eyes: PERRL, EOMI, pink conjunctiva  HENT: Normal oral mucosa  Cardiovascular: RRR, S1, S2, no murmurs, rubs, or gallops; no edema; no JVD  Respiratory: Clear to auscultation bilaterally  Gastrointestinal: soft, non-tender, non-distended with normal bowel sounds  Musculoskeletal: No clubbing; no joint deformity   Neurologic: Non-focal  Lymphatic: No lymphadenopathy  Psychiatry: AAOx3, mood & affect appropriate  Skin: No rashes, ecchymoses, or cyanosis                          13.1   6.80  )-----------( 271      ( 08 Dec 2019 06:52 )             40.1         141  |  105  |  10  ----------------------------<  141<H>  3.7   |  24  |  0.60    Ca    8.8      08 Dec 2019 06:52  Phos  3.2       Mg     2.5         TPro  6.2  /  Alb  3.4  /  TBili  0.5  /  DBili  x   /  AST  22  /  ALT  14  /  AlkPhos  76      PT/INR - ( 08 Dec 2019 06:52 )   PT: 12.0 sec;   INR: 1.05 ratio         PTT - ( 08 Dec 2019 06:52 )  PTT:47.7 sec  CARDIAC MARKERS ( 08 Dec 2019 06:52 )  x     / x     / 96 U/L / x     / 9.7 ng/mL  CARDIAC MARKERS ( 07 Dec 2019 23:01 )  x     / x     / 113 U/L / x     / 11.9 ng/mL      Serum Pro-Brain Natriuretic Peptide: 295 pg/mL ( @ 23:01)  Serum Pro-Brain Natriuretic Peptide: 185 pg/mL ( @ 17:23)    Total Cholesterol: 151  LDL: 82  HDL: 54  T    Hemoglobin A1C, Whole Blood: 5.9 % ( @ 04:50)    Interpretation of Telemetry:  sinus rhythm, HR 50s-60s

## 2019-12-08 NOTE — PROGRESS NOTE ADULT - ASSESSMENT
65 yo woman w/ fam hx of CAD (brother w/ MI in his 40s, father w/ MI in his 50s), HTN who presented from OSH w/ NSTEMI.    Chest pain ersolved, Gayle plateau w/ non-specific ischemic changes on ECG     RECS  - cont asa, plavix, hep GTT, high dose statin and isordil   - [facility] w/ Dr. Brunner tomorrow  - if she ends up getting PCI; recommend reloading w/ Brillinta   - TTE this morning     MARCIAL Duran MD   [place] CCU2 [id number]

## 2019-12-09 LAB
ALBUMIN SERPL ELPH-MCNC: 3.6 G/DL — SIGNIFICANT CHANGE UP (ref 3.3–5)
ALP SERPL-CCNC: 81 U/L — SIGNIFICANT CHANGE UP (ref 40–120)
ALT FLD-CCNC: 15 U/L — SIGNIFICANT CHANGE UP (ref 10–45)
ANION GAP SERPL CALC-SCNC: 12 MMOL/L — SIGNIFICANT CHANGE UP (ref 5–17)
APTT BLD: 62.3 SEC — HIGH (ref 27.5–36.3)
APTT BLD: 74.4 SEC — HIGH (ref 27.5–36.3)
AST SERPL-CCNC: 19 U/L — SIGNIFICANT CHANGE UP (ref 10–40)
BILIRUB SERPL-MCNC: 0.3 MG/DL — SIGNIFICANT CHANGE UP (ref 0.2–1.2)
BUN SERPL-MCNC: 9 MG/DL — SIGNIFICANT CHANGE UP (ref 7–23)
CALCIUM SERPL-MCNC: 9.4 MG/DL — SIGNIFICANT CHANGE UP (ref 8.4–10.5)
CHLORIDE SERPL-SCNC: 107 MMOL/L — SIGNIFICANT CHANGE UP (ref 96–108)
CO2 SERPL-SCNC: 24 MMOL/L — SIGNIFICANT CHANGE UP (ref 22–31)
CREAT SERPL-MCNC: 0.67 MG/DL — SIGNIFICANT CHANGE UP (ref 0.5–1.3)
GLUCOSE SERPL-MCNC: 123 MG/DL — HIGH (ref 70–99)
INR BLD: 0.99 RATIO — SIGNIFICANT CHANGE UP (ref 0.88–1.16)
MAGNESIUM SERPL-MCNC: 2.2 MG/DL — SIGNIFICANT CHANGE UP (ref 1.6–2.6)
PHOSPHATE SERPL-MCNC: 3.9 MG/DL — SIGNIFICANT CHANGE UP (ref 2.5–4.5)
POTASSIUM SERPL-MCNC: 4 MMOL/L — SIGNIFICANT CHANGE UP (ref 3.5–5.3)
POTASSIUM SERPL-SCNC: 4 MMOL/L — SIGNIFICANT CHANGE UP (ref 3.5–5.3)
PROT SERPL-MCNC: 6.6 G/DL — SIGNIFICANT CHANGE UP (ref 6–8.3)
PROTHROM AB SERPL-ACNC: 11.4 SEC — SIGNIFICANT CHANGE UP (ref 10–12.9)
SODIUM SERPL-SCNC: 143 MMOL/L — SIGNIFICANT CHANGE UP (ref 135–145)

## 2019-12-09 PROCEDURE — 93010 ELECTROCARDIOGRAM REPORT: CPT

## 2019-12-09 PROCEDURE — 99233 SBSQ HOSP IP/OBS HIGH 50: CPT | Mod: GC

## 2019-12-09 PROCEDURE — 93454 CORONARY ARTERY ANGIO S&I: CPT | Mod: 26,59

## 2019-12-09 PROCEDURE — 99152 MOD SED SAME PHYS/QHP 5/>YRS: CPT

## 2019-12-09 PROCEDURE — 92941 PRQ TRLML REVSC TOT OCCL AMI: CPT | Mod: LD

## 2019-12-09 RX ORDER — METOPROLOL TARTRATE 50 MG
12.5 TABLET ORAL
Refills: 0 | Status: DISCONTINUED | OUTPATIENT
Start: 2019-12-10 | End: 2019-12-10

## 2019-12-09 RX ADMIN — HEPARIN SODIUM 900 UNIT(S)/HR: 5000 INJECTION INTRAVENOUS; SUBCUTANEOUS at 14:42

## 2019-12-09 RX ADMIN — CLOPIDOGREL BISULFATE 75 MILLIGRAM(S): 75 TABLET, FILM COATED ORAL at 11:58

## 2019-12-09 RX ADMIN — HEPARIN SODIUM 900 UNIT(S)/HR: 5000 INJECTION INTRAVENOUS; SUBCUTANEOUS at 03:29

## 2019-12-09 RX ADMIN — Medication 81 MILLIGRAM(S): at 11:58

## 2019-12-09 RX ADMIN — CHLORHEXIDINE GLUCONATE 1 APPLICATION(S): 213 SOLUTION TOPICAL at 06:02

## 2019-12-09 RX ADMIN — PANTOPRAZOLE SODIUM 40 MILLIGRAM(S): 20 TABLET, DELAYED RELEASE ORAL at 06:02

## 2019-12-09 RX ADMIN — ATORVASTATIN CALCIUM 80 MILLIGRAM(S): 80 TABLET, FILM COATED ORAL at 21:21

## 2019-12-09 NOTE — DIETITIAN INITIAL EVALUATION ADULT. - PERTINENT LABORATORY DATA
12-09 Na143 mmol/L Glu 123 mg/dL<H> K+ 4.0 mmol/L Cr  0.67 mg/dL BUN 9 mg/dL Phos 3.9 mg/dL Alb 3.6 g/dL PAB n/a

## 2019-12-09 NOTE — PROGRESS NOTE ADULT - SUBJECTIVE AND OBJECTIVE BOX
====================  CCU MIDNIGHT ROUNDS  ====================    GRACIELA CABRERA  [id number]  Patient is a 64y old  Female who presents with a chief complaint of NSTEMI (09 Dec 2019 07:34)      ====================  Summary : 64F Hx of strong family h/o early CAD, borderline HTN, IBS, GERD, hernia s/p repair w/ mesh, known lipomas on pancreatis/liver; transferred from [facility] for NSTEMI briefly requiring Nitro gtt ,now on hydralazine.TTE with Ef 62%, Mild segmental left ventricular systolic dysfunction.Mild diastolic dysfunction (Stage I). s/p LHC today , STEPHEN to 70-80 %mLAD via right radial  ====================        ====================  NEW EVENTS :no event   NSR at 60-70  ====================        ====================  VITALS (Last 12 hrs):  ====================    T(C): 36.9 (12-09-19 @ 20:00), Max: 36.9 (12-09-19 @ 20:00)  T(F): 98.5 (12-09-19 @ 20:00), Max: 98.5 (12-09-19 @ 20:00)  HR: 64 (12-09-19 @ 22:00) (58 - 78)  BP: 121/81 (12-09-19 @ 22:00) (119/73 - 162/94)  BP(mean): 97 (12-09-19 @ 22:00) (91 - 117)  RR: 28 (12-09-19 @ 22:00) (15 - 30)  SpO2: 98% (12-09-19 @ 22:00) (96% - 100%)    I&O's Summary    08 Dec 2019 07:01  -  09 Dec 2019 07:00  --------------------------------------------------------  IN: 1287.5 mL / OUT: 0 mL / NET: 1287.5 mL    09 Dec 2019 07:01  -  09 Dec 2019 22:49  --------------------------------------------------------  IN: 401 mL / OUT: 1000 mL / NET: -599 mL            ====================  NEW LABS:  ====================                          13.6   8.12  )-----------( 265      ( 09 Dec 2019 02:44 )             41.0     12-09    143  |  107  |  9   ----------------------------<  123<H>  4.0   |  24  |  0.67    Ca    9.4      09 Dec 2019 02:44  Phos  3.9     12-09  Mg     2.2     12-09    TPro  6.6  /  Alb  3.6  /  TBili  0.3  /  DBili  x   /  AST  19  /  ALT  15  /  AlkPhos  81  12-09    PT/INR - ( 09 Dec 2019 02:44 )   PT: 11.4 sec;   INR: 0.99 ratio         PTT - ( 09 Dec 2019 11:25 )  PTT:62.3 sec              - ====================  CCU MIDNIGHT ROUNDS  ====================    GRACIELA CABRERA  [id number]  Patient is a 64y old  Female who presents with a chief complaint of NSTEMI (09 Dec 2019 07:34)      ====================  Summary : 64F Hx of strong family h/o early CAD, borderline HTN, IBS, GERD, hernia s/p repair w/ mesh, known lipomas on pancreatis/liver; transferred from [facility] for NSTEMI briefly requiring Nitro gtt ,now on hydralazine.TTE with Ef 62%, Mild segmental left ventricular systolic dysfunction.Mild diastolic dysfunction (Stage I). s/p LHC today , STEPHEN to 70-80 %mLAD via right radial  ====================      ====================  NEW EVENTS :no event   NSR at 60-70  ====================  REVIEW OF SYSTEMS    Cardiovascular:no chest pain ,no palpitation,no dizziness,no diaphoresis,no edema  Respiratory: no SOB, cough or wheeze.  Gastrointestinal:  no N/V/D,	  Psychiatric: no unusual stress/anxiety          PHYSICAL EXAM:    Appearance: Normal	  Cardiovascular: Normal S1 S2, No JVD, No murmurs, No edema  Respiratory: Lungs clear to auscultation	  Gastrointestinal:  Soft, Non-tender, + BS	 	  Extremities: Normal range of motion, No clubbing, cyanosis or edema, mild ecchymosis rt radial  at cath site  Vascular: Peripheral pulses palpable 2+ bilaterally      	        ====================  VITALS (Last 12 hrs):  ====================    T(C): 36.9 (12-09-19 @ 20:00), Max: 36.9 (12-09-19 @ 20:00)  T(F): 98.5 (12-09-19 @ 20:00), Max: 98.5 (12-09-19 @ 20:00)  HR: 64 (12-09-19 @ 22:00) (58 - 78)  BP: 121/81 (12-09-19 @ 22:00) (119/73 - 162/94)  BP(mean): 97 (12-09-19 @ 22:00) (91 - 117)  RR: 28 (12-09-19 @ 22:00) (15 - 30)  SpO2: 98% (12-09-19 @ 22:00) (96% - 100%)    I&O's Summary    08 Dec 2019 07:01  -  09 Dec 2019 07:00  --------------------------------------------------------  IN: 1287.5 mL / OUT: 0 mL / NET: 1287.5 mL    09 Dec 2019 07:01  -  09 Dec 2019 22:49  --------------------------------------------------------  IN: 401 mL / OUT: 1000 mL / NET: -599 mL            ====================  NEW LABS:  ====================                          13.6   8.12  )-----------( 265      ( 09 Dec 2019 02:44 )             41.0     12-09    143  |  107  |  9   ----------------------------<  123<H>  4.0   |  24  |  0.67    Ca    9.4      09 Dec 2019 02:44  Phos  3.9     12-09  Mg     2.2     12-09    TPro  6.6  /  Alb  3.6  /  TBili  0.3  /  DBili  x   /  AST  19  /  ALT  15  /  AlkPhos  81  12-09    PT/INR - ( 09 Dec 2019 02:44 )   PT: 11.4 sec;   INR: 0.99 ratio         PTT - ( 09 Dec 2019 11:25 )  PTT:62.3 sec              -

## 2019-12-09 NOTE — DIETITIAN INITIAL EVALUATION ADULT. - PERTINENT MEDS FT
MEDICATIONS  (STANDING):  aspirin enteric coated 81 milliGRAM(s) Oral daily  atorvastatin 80 milliGRAM(s) Oral at bedtime  chlorhexidine 2% Cloths 1 Application(s) Topical <User Schedule>  clopidogrel Tablet 75 milliGRAM(s) Oral daily  heparin  Infusion. 700 Unit(s)/Hr (7 mL/Hr) IV Continuous <Continuous>  hydrALAZINE 10 milliGRAM(s) Oral every 8 hours  pantoprazole    Tablet 40 milliGRAM(s) Oral before breakfast

## 2019-12-09 NOTE — PROGRESS NOTE ADULT - ASSESSMENT
64F Hx of strong family h/o early CAD, borderline HTN, IBS, GERD, hernia s/p repair w/ mesh, known lipomas on pancreatis/liver; transferred from [facility] for NSTEMI briefly requiring Nitro gtt, pending ischemic work up.

## 2019-12-09 NOTE — PROGRESS NOTE ADULT - PROBLEM SELECTOR PLAN 1
-Cont. ASA, Plavix, Lipitor  -s/p STEPHEN to mLAD  -echo with normal EF 62%, Mild segmental left ventricular systolic dysfunction. stage I diastolic dysfunction  will start on lopressor 12.5mg BID in am and eventaully ACE-I

## 2019-12-09 NOTE — CHART NOTE - NSCHARTNOTEFT_GEN_A_CORE
RADIAL BAND REMOVAL NOTE    Right radial band removed without any complications. Prior to radial band removal ecchymosis distal to band and small soft hematoma proximal to band noted. No bleeding or hematoma noterd after radila removal. Positive peripheral pulses. Good capillary refill.   RN to monitor site for bleeding and hematoma.

## 2019-12-09 NOTE — PROGRESS NOTE ADULT - SUBJECTIVE AND OBJECTIVE BOX
CCU2 NOTE    Admission date: 19  Chief complaint/ Diagnosis: NSTEMI     HPI: 64F Hx of strong family h/o early CAD, borderline HTN, IBS, GERD, hernia s/p repair w/ mesh, known lipomas on pancreatis/liver; transferred from [facility] for NSTEMI briefly requiring Nitro gtt, pending ischemic work up.      Interval history: Uneventful overnight  Pt remains hemodynamically stable and CP free  intermittent Headache   d/c isodil   tramadol x 1 and hydralazine for bp control     REVIEW OF SYSTEMS  Denies CP, Palpitation, SOB, Dyspnea [ x ] All other systems negative    MEDICATIONS  (STANDING)  aspirin enteric coated 81 milliGRAM(s) Oral daily  atorvastatin 80 milliGRAM(s) Oral at bedtime  chlorhexidine 2% Cloths 1 Application(s) Topical <User Schedule>  clopidogrel Tablet 75 milliGRAM(s) Oral daily  heparin  Infusion. 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  Irritable bowel syndrome with diarrhea  Back pain, chronic: history of lumbar compression fracture  GERD (gastroesophageal reflux disease)  Diaphragmatic hernia  History of Nissen fundoplication  History of lumpectomy: bilateral  History of cholecystectomy  H/O abdominal hysterectomy  FAMILY HISTORY:  Family history of brain tumor  Family history of acute myocardial infarction (Sibling): Father age 50s (MI)  Brother age 47 ()  Family history of atrial fibrillation  Family history of hypertension    latex (Hives)  No Known Drug Allergies      ICU Vital Signs Last 24 Hrs  T(C): 36.3 (Max: 36.9)  HR: 71  (55 - 84)  BP: 129/79 (105/57 - 167/88)  RR: 20  (14 - 27)  SpO2: 98% (97% - 98%) in room air     I&O's Summary  IN: 1287.5 mL / OUT: 0 mL / NET: 1287.5 mL      PHYSICAL EXAM  Appearance: Normal, NAD  HEAD:  Normocephalic  EYES: PERRLA, conjunctiva and sclera clear  NECK: Supple, No JVD  CHEST/LUNG: Clear to auscultation bilaterally; No wheezing  HEART: Normal S1, S2. No murmurs, rubs, or gallops  ABDOMEN: + Bowel sounds, Soft, NT, ND   EXTREMITIES:  2+ Peripheral Pulses, No clubbing, cyanosis, or edema  NEUROLOGY: non-focal, aaox3  SKIN: No rashes or lesions    Interpretation of Telemetry: NSR                      13.6   8.12  )-----------( 265                  41.0     143  |  107  |  9   ----------------------<  123<H>  4.0   |  24  |  0.67    Ca    9.4      Phos  3.9     Mg     2.2       TPro  6.6  /  Alb  3.6  /  TBili  0.3  /  DBili  x   /  AST  19  /  ALT  15  /  AlkPhos  81      CARDIAC MARKERS ( 08 Dec 2019 06:52 )  x     / x     / 96 U/L / x     / 9.7 ng/mL  CARDIAC MARKERS ( 07 Dec 2019 23:01 )  x     / x     / 113 U/L / x     / 11.9 ng/mL       TTE: EF 62%, mild segmental LVSD, epex + apical inferior walls are akinetic, stage 1 diastolic dysfunction

## 2019-12-09 NOTE — DIETITIAN INITIAL EVALUATION ADULT. - ADD RECOMMEND
1. Continue DASH diet as ordered. 2. Recommend Multivitamin supplementation daily in setting of suboptimal PO intake. 3. Heart Healthy diet education provided - literature left at bedside. Pt made aware RD remains available for additional information PRN. 4. Monitor PO intake/tolerance, weights, labs, hydration status, bowels, and skin integrity. 5. New malnutrition alert placed - Will follow-up according to protocol.

## 2019-12-09 NOTE — PROGRESS NOTE ADULT - ASSESSMENT
64F Hx of strong family h/o early CAD, borderline HTN, IBS, GERD, hernia s/p repair w/ mesh, known lipomas on pancreatis/liver; transferred from [facility] for NSTEMI briefly requiring Nitro gtt , now on hydralazine.TTE with Ef 62%, Mild segmental left ventricular systolic dysfunction. Mild diastolic dysfunction (Stage I). s/p LHC today , STEPHEN to 70-80 %mLAD via right radial

## 2019-12-09 NOTE — CHART NOTE - NSCHARTNOTEFT_GEN_A_CORE
Upon Nutritional Assessment by the Registered Dietitian your patient was determined to meet criteria / has evidence of the following diagnosis/diagnoses:          [ ]  Mild Protein Calorie Malnutrition        [ x]  Moderate Protein Calorie Malnutrition        [ ] Severe Protein Calorie Malnutrition        [ ] Unspecified Protein Calorie Malnutrition        [ ] Underweight / BMI <19        [ ] Morbid Obesity / BMI > 40      Findings as based on:  [ x] Comprehensive nutrition assessment   [ x] Nutrition Focused Physical Exam  [ x] Other:  meeting <75% est needs >1 mo., sev/mod muscle wasting, mod fat loss, wt loss.       Nutrition Plan/Recommendations:    1. Continue DASH diet as ordered.   2. Recommend Multivitamin supplementation daily in setting of suboptimal PO intake.   3. Heart Healthy diet education provided - literature left at bedside. Pt made aware RD remains available for additional information PRN.   4. Monitor PO intake/tolerance, weights, labs, hydration status, bowels, and skin integrity.   5. New malnutrition alert placed - Will follow-up according to protocol.        PROVIDER Section:     By signing this assessment you are acknowledging and agree with the diagnosis/diagnoses assigned by the Registered Dietitian    Comments:

## 2019-12-09 NOTE — DIETITIAN INITIAL EVALUATION ADULT. - PROBLEM SELECTOR PLAN 1
DAPT (ASA/Plavix)  Consider Brilinta   High intensity stain   Titrated off Nitro gtt   Start Isordil 10mg TID  Hold BB (sinus monty)  Obtain TTE in AM  Trend CE, send BNP  BMP / Mag / Phos   TSH / A1c / Lipid   Possible GI etiology  Start PPI for now  Ischemic work up

## 2019-12-09 NOTE — DIETITIAN INITIAL EVALUATION ADULT. - OTHER INFO
"64F Hx of strong family h/o early CAD, borderline HTN, IBS, GERD, hernia s/p repair w/ mesh, known lipomas on pancreatis/liver; transferred from [facility] for NSTEMI briefly requiring Nitro gtt, pending ischemic work up."    Pt notes decreased appetite for ~1 year now. Notes she usually only consumes 1 meal/day at home or will consume small snacks throughout the day. In house she notes consuming <50% at meals. She admits to having hiatal hernia surgery 5 years ago and notes 50 Ib weight loss since then; admits within the last 6 months has lost ~7 pounds; standing weight 155.6 pounds (12/08). Nutrition focused physical exam conducted with verbal consent from pt; findings reveal: severe muscle wasting of temples, moderate muscle wasting of clavicles and shoulders, moderate subcutaneous fat loss of triceps, orbitals and buccals. Pt declined nutrition supplements at this time.  Denies N+V, admits to slight constipation, last Bm 12/6 - declines prune/prune juice also notes not wanting stool softeners; no difficulties chewing/swallowing. NKFA. Admits to Multivitamin supplementation PTA; states sometimes takes calcium supplements 2/2 osteoporosis, however not consistent with intake.    Discussed DASH diet education. Reviewed foods high in Na and cholesterol to avoid. Reviewed ways to decrease Na in your diet, discussed meal and snack options. Pt admits to not adding salt to foods at home, also admits to not consuming fatty meats. Provided written Heart Healthy Eating Nutrition Therapy handout to Pt.

## 2019-12-09 NOTE — DIETITIAN INITIAL EVALUATION ADULT. - PHYSICAL APPEARANCE
other (specify)/overweight Ht: 5'4" Wt: 155.6 pounds BMI: 26.7 kg/m2 IBW: 120 pounds (+/-10%) 130%IBW  Edema: none noted  Skin per nursing flowsheets: no pressure injuries noted

## 2019-12-10 ENCOUNTER — TRANSCRIPTION ENCOUNTER (OUTPATIENT)
Age: 64
End: 2019-12-10

## 2019-12-10 VITALS — RESPIRATION RATE: 57 BRPM | HEART RATE: 89 BPM

## 2019-12-10 LAB
ANION GAP SERPL CALC-SCNC: 12 MMOL/L — SIGNIFICANT CHANGE UP (ref 5–17)
BASOPHILS # BLD AUTO: 0.11 K/UL — SIGNIFICANT CHANGE UP (ref 0–0.2)
BASOPHILS NFR BLD AUTO: 1.2 % — SIGNIFICANT CHANGE UP (ref 0–2)
BUN SERPL-MCNC: 10 MG/DL — SIGNIFICANT CHANGE UP (ref 7–23)
CALCIUM SERPL-MCNC: 8.9 MG/DL — SIGNIFICANT CHANGE UP (ref 8.4–10.5)
CHLORIDE SERPL-SCNC: 104 MMOL/L — SIGNIFICANT CHANGE UP (ref 96–108)
CO2 SERPL-SCNC: 23 MMOL/L — SIGNIFICANT CHANGE UP (ref 22–31)
CREAT SERPL-MCNC: 0.61 MG/DL — SIGNIFICANT CHANGE UP (ref 0.5–1.3)
EOSINOPHIL # BLD AUTO: 0.38 K/UL — SIGNIFICANT CHANGE UP (ref 0–0.5)
EOSINOPHIL NFR BLD AUTO: 4.1 % — SIGNIFICANT CHANGE UP (ref 0–6)
GLUCOSE SERPL-MCNC: 115 MG/DL — HIGH (ref 70–99)
HCT VFR BLD CALC: 42.5 % — SIGNIFICANT CHANGE UP (ref 34.5–45)
HGB BLD-MCNC: 14.2 G/DL — SIGNIFICANT CHANGE UP (ref 11.5–15.5)
IMM GRANULOCYTES NFR BLD AUTO: 0.3 % — SIGNIFICANT CHANGE UP (ref 0–1.5)
LYMPHOCYTES # BLD AUTO: 1.93 K/UL — SIGNIFICANT CHANGE UP (ref 1–3.3)
LYMPHOCYTES # BLD AUTO: 20.9 % — SIGNIFICANT CHANGE UP (ref 13–44)
MAGNESIUM SERPL-MCNC: 2.1 MG/DL — SIGNIFICANT CHANGE UP (ref 1.6–2.6)
MCHC RBC-ENTMCNC: 31.1 PG — SIGNIFICANT CHANGE UP (ref 27–34)
MCHC RBC-ENTMCNC: 33.4 GM/DL — SIGNIFICANT CHANGE UP (ref 32–36)
MCV RBC AUTO: 93.2 FL — SIGNIFICANT CHANGE UP (ref 80–100)
MONOCYTES # BLD AUTO: 0.96 K/UL — HIGH (ref 0–0.9)
MONOCYTES NFR BLD AUTO: 10.4 % — SIGNIFICANT CHANGE UP (ref 2–14)
NEUTROPHILS # BLD AUTO: 5.82 K/UL — SIGNIFICANT CHANGE UP (ref 1.8–7.4)
NEUTROPHILS NFR BLD AUTO: 63.1 % — SIGNIFICANT CHANGE UP (ref 43–77)
NRBC # BLD: 0 /100 WBCS — SIGNIFICANT CHANGE UP (ref 0–0)
PHOSPHATE SERPL-MCNC: 4.1 MG/DL — SIGNIFICANT CHANGE UP (ref 2.5–4.5)
PLATELET # BLD AUTO: 268 K/UL — SIGNIFICANT CHANGE UP (ref 150–400)
POTASSIUM SERPL-MCNC: 4.1 MMOL/L — SIGNIFICANT CHANGE UP (ref 3.5–5.3)
POTASSIUM SERPL-SCNC: 4.1 MMOL/L — SIGNIFICANT CHANGE UP (ref 3.5–5.3)
RBC # BLD: 4.56 M/UL — SIGNIFICANT CHANGE UP (ref 3.8–5.2)
RBC # FLD: 12.3 % — SIGNIFICANT CHANGE UP (ref 10.3–14.5)
SODIUM SERPL-SCNC: 139 MMOL/L — SIGNIFICANT CHANGE UP (ref 135–145)
WBC # BLD: 9.23 K/UL — SIGNIFICANT CHANGE UP (ref 3.8–10.5)
WBC # FLD AUTO: 9.23 K/UL — SIGNIFICANT CHANGE UP (ref 3.8–10.5)

## 2019-12-10 PROCEDURE — 93454 CORONARY ARTERY ANGIO S&I: CPT | Mod: 59

## 2019-12-10 PROCEDURE — 80048 BASIC METABOLIC PNL TOTAL CA: CPT

## 2019-12-10 PROCEDURE — 80061 LIPID PANEL: CPT

## 2019-12-10 PROCEDURE — 96375 TX/PRO/DX INJ NEW DRUG ADDON: CPT

## 2019-12-10 PROCEDURE — C9606: CPT | Mod: LD

## 2019-12-10 PROCEDURE — 96374 THER/PROPH/DIAG INJ IV PUSH: CPT

## 2019-12-10 PROCEDURE — 99153 MOD SED SAME PHYS/QHP EA: CPT

## 2019-12-10 PROCEDURE — 99291 CRITICAL CARE FIRST HOUR: CPT | Mod: 25

## 2019-12-10 PROCEDURE — 82553 CREATINE MB FRACTION: CPT

## 2019-12-10 PROCEDURE — 85730 THROMBOPLASTIN TIME PARTIAL: CPT

## 2019-12-10 PROCEDURE — 85610 PROTHROMBIN TIME: CPT

## 2019-12-10 PROCEDURE — 83735 ASSAY OF MAGNESIUM: CPT

## 2019-12-10 PROCEDURE — 93005 ELECTROCARDIOGRAM TRACING: CPT

## 2019-12-10 PROCEDURE — 86850 RBC ANTIBODY SCREEN: CPT

## 2019-12-10 PROCEDURE — 99152 MOD SED SAME PHYS/QHP 5/>YRS: CPT

## 2019-12-10 PROCEDURE — 86900 BLOOD TYPING SEROLOGIC ABO: CPT

## 2019-12-10 PROCEDURE — C8929: CPT

## 2019-12-10 PROCEDURE — 86901 BLOOD TYPING SEROLOGIC RH(D): CPT

## 2019-12-10 PROCEDURE — 84100 ASSAY OF PHOSPHORUS: CPT

## 2019-12-10 PROCEDURE — C1894: CPT

## 2019-12-10 PROCEDURE — 80053 COMPREHEN METABOLIC PANEL: CPT

## 2019-12-10 PROCEDURE — 99238 HOSP IP/OBS DSCHRG MGMT 30/<: CPT | Mod: GC

## 2019-12-10 PROCEDURE — C1874: CPT

## 2019-12-10 PROCEDURE — 86803 HEPATITIS C AB TEST: CPT

## 2019-12-10 PROCEDURE — 83880 ASSAY OF NATRIURETIC PEPTIDE: CPT

## 2019-12-10 PROCEDURE — 82550 ASSAY OF CK (CPK): CPT

## 2019-12-10 PROCEDURE — 71045 X-RAY EXAM CHEST 1 VIEW: CPT

## 2019-12-10 PROCEDURE — 85027 COMPLETE CBC AUTOMATED: CPT

## 2019-12-10 PROCEDURE — 93010 ELECTROCARDIOGRAM REPORT: CPT

## 2019-12-10 PROCEDURE — C1769: CPT

## 2019-12-10 PROCEDURE — C1887: CPT

## 2019-12-10 PROCEDURE — 83036 HEMOGLOBIN GLYCOSYLATED A1C: CPT

## 2019-12-10 PROCEDURE — 84484 ASSAY OF TROPONIN QUANT: CPT

## 2019-12-10 PROCEDURE — 83605 ASSAY OF LACTIC ACID: CPT

## 2019-12-10 RX ORDER — LISINOPRIL 2.5 MG/1
1 TABLET ORAL
Qty: 30 | Refills: 0
Start: 2019-12-10 | End: 2020-01-08

## 2019-12-10 RX ORDER — ASPIRIN/CALCIUM CARB/MAGNESIUM 324 MG
1 TABLET ORAL
Qty: 0 | Refills: 0 | DISCHARGE
Start: 2019-12-10

## 2019-12-10 RX ORDER — LISINOPRIL 2.5 MG/1
5 TABLET ORAL DAILY
Refills: 0 | Status: DISCONTINUED | OUTPATIENT
Start: 2019-12-10 | End: 2019-12-10

## 2019-12-10 RX ORDER — ATORVASTATIN CALCIUM 80 MG/1
1 TABLET, FILM COATED ORAL
Qty: 90 | Refills: 0
Start: 2019-12-10 | End: 2020-03-08

## 2019-12-10 RX ORDER — CLOPIDOGREL BISULFATE 75 MG/1
1 TABLET, FILM COATED ORAL
Qty: 90 | Refills: 0
Start: 2019-12-10 | End: 2020-03-08

## 2019-12-10 RX ORDER — METOPROLOL TARTRATE 50 MG
1 TABLET ORAL
Qty: 30 | Refills: 0
Start: 2019-12-10 | End: 2020-01-08

## 2019-12-10 RX ORDER — PANTOPRAZOLE SODIUM 20 MG/1
1 TABLET, DELAYED RELEASE ORAL
Qty: 30 | Refills: 0
Start: 2019-12-10 | End: 2020-01-08

## 2019-12-10 RX ADMIN — CLOPIDOGREL BISULFATE 75 MILLIGRAM(S): 75 TABLET, FILM COATED ORAL at 12:05

## 2019-12-10 RX ADMIN — CHLORHEXIDINE GLUCONATE 1 APPLICATION(S): 213 SOLUTION TOPICAL at 05:25

## 2019-12-10 RX ADMIN — PANTOPRAZOLE SODIUM 40 MILLIGRAM(S): 20 TABLET, DELAYED RELEASE ORAL at 05:24

## 2019-12-10 RX ADMIN — Medication 81 MILLIGRAM(S): at 12:05

## 2019-12-10 RX ADMIN — Medication 12.5 MILLIGRAM(S): at 05:24

## 2019-12-10 RX ADMIN — Medication 1 TABLET(S): at 12:05

## 2019-12-10 RX ADMIN — LISINOPRIL 5 MILLIGRAM(S): 2.5 TABLET ORAL at 09:32

## 2019-12-10 NOTE — PROGRESS NOTE ADULT - ASSESSMENT
64F Hx of strong family h/o early CAD, borderline HTN, IBS, GERD, hernia s/p repair w/ mesh, known lipomas on pancreatis/liver; transferred from [facility] for NSTEMI briefly requiring Nitro gtt, Now s/p PCI STEPHEN to AD; TTE EF 62%

## 2019-12-10 NOTE — PROGRESS NOTE ADULT - PROBLEM SELECTOR PLAN 1
Cont. ASA, Plavix, Lipitor, Metoprolol 12.5 BID  d/c Hydralazine  Start ACEi - Lisinopril 5mg   Increase activity  Review medications, activity, site care, follow up plan for discharge later today if stable

## 2019-12-10 NOTE — DISCHARGE NOTE PROVIDER - NSDCCPCAREPLAN_GEN_ALL_CORE_FT
PRINCIPAL DISCHARGE DIAGNOSIS  Diagnosis: NSTEMI (non-ST elevated myocardial infarction)  Assessment and Plan of Treatment: c/w DAPT  f/u  with cardiology PRINCIPAL DISCHARGE DIAGNOSIS  Diagnosis: NSTEMI (non-ST elevated myocardial infarction)  Assessment and Plan of Treatment: Do Not Stop taking Aspirin or Plavix without speaking with your cardiologist  No heavy lifting or pushing/pulling with procedure arm for 2 weeks. No driving for 2 days. You may shower 24 hours following the procedure but avoid baths/swimming for 1 week. Check your wrist site for bleeding and/or swelling daily following procedure and call your doctor immediately if it occurs or if you experience increased pain at the site. Follow up with your cardiologist in 1-2 weeks. You may call [facility] if you have any questions/concerns regarding your procedure [phone].

## 2019-12-10 NOTE — DISCHARGE NOTE PROVIDER - NSDCACTIVITY_GEN_ALL_CORE
No heavy lifting/straining/Showering allowed/Stairs allowed/Walking - Outdoors allowed/Walking - Indoors allowed

## 2019-12-10 NOTE — PROGRESS NOTE ADULT - SUBJECTIVE AND OBJECTIVE BOX
Admission date: Dec 7th  CHIEF COMPLAINT:  HPI: 64 year old female with Hx of strong family h/o early CAD, borderline HTN (not on medications), IBS, GERD, hiatal hernia s/p repair w/ mesh, known lipomas on pancreatis & liver for which patient is followed closely with yearly MRI scans who presents to [facility] today with c/o "midsternal 8/10 sharp continuos chest pain radiating to neck and upper back" x few hours, not associated with any other factors.  While in ED, noted to have elevated CE, no EKG changes at that time.  ACS protocol initiated with Heparin gtt s/p ASA/Plavix load.  Patient transferred to [facility] for potential LHC.  On arrival, c/o 3-10 MSCP dull for which a Nitro gtt was started, gtt currently at 20mcg, patient now denies pain.  Denies fever/chills, sob, now cp free, palpitations, abd pain, n/v/c.  Patient underwent a LHC 2016 in setting of unstable angina which revealed normal coronaries, LV gram EF 55%.  EKG w/ inferiorlateral wall TWIs, no ST elevations or depressions. (07 Dec 2019 22:35)    INTERVAL HISTORY: No acute events overnight  Sitting up in chair     REVIEW OF SYSTEMS: Denies          ; all others negative    MEDICATIONS  (STANDING):  aspirin enteric coated 81 milliGRAM(s) Oral daily  atorvastatin 80 milliGRAM(s) Oral at bedtime  chlorhexidine 2% Cloths 1 Application(s) Topical <User Schedule>  clopidogrel Tablet 75 milliGRAM(s) Oral daily  lisinopril 5 milliGRAM(s) Oral daily  metoprolol tartrate 12.5 milliGRAM(s) Oral two times a day  multivitamin 1 Tablet(s) Oral daily  pantoprazole    Tablet 40 milliGRAM(s) Oral before breakfast    MEDICATIONS  (PRN):      Objective:  ICU Vital Signs Last 24 Hrs  T(C): 36.8 (10 Dec 2019 07:00), Max: 36.9 (09 Dec 2019 20:00)  T(F): 98.2 (10 Dec 2019 07:00), Max: 98.5 (09 Dec 2019 20:00)  HR: 58 (10 Dec 2019 08:00) (54 - 78)  BP: 109/60 (10 Dec 2019 08:00) (98/53 - 162/94)  BP(mean): 79 (10 Dec 2019 08:00) (72 - 117)  ABP: --  ABP(mean): --  RR: 26 (10 Dec 2019 08:00) (13 - 30)  SpO2: 97% (10 Dec 2019 07:00) (95% - 100%)           @ 07:01  -  12-10 @ 07:00  --------------------------------------------------------  IN: 601 mL / OUT: 1000 mL / NET: -399 mL      Daily     Daily Weight in k.4 (10 Dec 2019 06:00)    PHYSICAL EXAM:  Constitutional:  HEENT:  Respiratory:  Cardiovascular:  Access site:  Gastrointestinal:  Genitourinary:  Extremities:  Vascular:  Neurological:  Skin:      TELEMETRY:     EKG:       Labs:                          14.2   9.23  )-----------( 268      ( 10 Dec 2019 05:49 )             42.5     12-10    139  |  104  |  10  ----------------------------<  115<H>  4.1   |  23  |  0.61    Ca    8.9      10 Dec 2019 05:49  Phos  4.1     12-10  Mg     2.1     1210    TPro  6.6  /  Alb  3.6  /  TBili  0.3  /  DBili  x   /  AST  19  /  ALT  15  /  AlkPhos  81  12    LIVER FUNCTIONS - ( 09 Dec 2019 02:44 )  Alb: 3.6 g/dL / Pro: 6.6 g/dL / ALK PHOS: 81 U/L / ALT: 15 U/L / AST: 19 U/L / GGT: x           PT/INR - ( 09 Dec 2019 02:44 )   PT: 11.4 sec;   INR: 0.99 ratio         PTT - ( 09 Dec 2019 11:25 )  PTT:62.3 sec        HEALTH ISSUES - PROBLEM Dx:  NSTEMI (non-ST elevated myocardial infarction): NSTEMI (non-ST elevated myocardial infarction) Admission date: Dec 7th  CHIEF COMPLAINT:  HPI: 64 year old female with Hx of strong family h/o early CAD, borderline HTN (not on medications), IBS, GERD, hiatal hernia s/p repair w/ mesh, known lipomas on pancreatis & liver for which patient is followed closely with yearly MRI scans who presents to [facility] today with c/o "midsternal 8/10 sharp continuos chest pain radiating to neck and upper back" x few hours, not associated with any other factors.  While in ED, noted to have elevated CE, no EKG changes at that time.  ACS protocol initiated with Heparin gtt s/p ASA/Plavix load.  Patient transferred to [facility] for potential LHC.  On arrival, c/o 3-10 MSCP dull for which a Nitro gtt was started, gtt currently at 20mcg, patient now denies pain.  Denies fever/chills, sob, now cp free, palpitations, abd pain, n/v/c.  Patient underwent a LHC 2016 in setting of unstable angina which revealed normal coronaries, LV gram EF 55%.  EKG w/ inferiorlateral wall TWIs, no ST elevations or depressions. (07 Dec 2019 22:35)    INTERVAL HISTORY: No acute events overnight  Sitting up in chair 'I feel good no more pain'    REVIEW OF SYSTEMS: Denies chest pain, jaw pain, dizziness, headache, SOB, palpitations, NV; all others negative    MEDICATIONS  (STANDING):  aspirin enteric coated 81 milliGRAM(s) Oral daily  atorvastatin 80 milliGRAM(s) Oral at bedtime  chlorhexidine 2% Cloths 1 Application(s) Topical <User Schedule>  clopidogrel Tablet 75 milliGRAM(s) Oral daily  lisinopril 5 milliGRAM(s) Oral daily  metoprolol tartrate 12.5 milliGRAM(s) Oral two times a day  multivitamin 1 Tablet(s) Oral daily  pantoprazole    Tablet 40 milliGRAM(s) Oral before breakfast    MEDICATIONS  (PRN):      Objective:  ICU Vital Signs Last 24 Hrs  T(C): 36.8 (10 Dec 2019 07:00), Max: 36.9 (09 Dec 2019 20:00)  T(F): 98.2 (10 Dec 2019 07:00), Max: 98.5 (09 Dec 2019 20:00)  HR: 58 (10 Dec 2019 08:00) (54 - 78)  BP: 109/60 (10 Dec 2019 08:00) (98/53 - 162/94)  BP(mean): 79 (10 Dec 2019 08:00) (72 - 117)  ABP: --  ABP(mean): --  RR: 26 (10 Dec 2019 08:00) (13 - 30)  SpO2: 97% (10 Dec 2019 07:00) (95% - 100%)           @ 07:01  -  12-10 @ 07:00  --------------------------------------------------------  IN: 601 mL / OUT: 1000 mL / NET: -399 mL      Daily     Daily Weight in k.4 (10 Dec 2019 06:00)    PHYSICAL EXAM:  Constitutional: NAD  HEENT: oral mucosa pink moist, sclera clear  Respiratory: Regular unlabored, CTA no wheeze  Cardiovascular: RRR S1S2 no M/R/G; no LE edema  Access site: Right radial site ok no hematoma/ecchymosis; + pulse, sensation/  Gastrointestinal: sift, ND/NT; + bowel sounds  Genitourinary: voiding on own  Extremities: CHAPIN equal strength  Vascular: warm peripherally  Neurological: A & O x 3 mood & affect appropriate  Skin: warm dry intact no rash      TELEMETRY: SR rare PVC    EKG: SR; HR 65; JOSEFA 140; QRS 92; QT/QTc 426/443      Labs:                          14.2   9.23  )-----------( 268      ( 10 Dec 2019 05:49 )             42.5     12-10    139  |  104  |  10  ----------------------------<  115<H>  4.1   |  23  |  0.61    Ca    8.9      10 Dec 2019 05:49  Phos  4.1     12-10  Mg     2.1     12-10    TPro  6.6  /  Alb  3.6  /  TBili  0.3  /  DBili  x   /  AST  19  /  ALT  15  /  AlkPhos  81  12    LIVER FUNCTIONS - ( 09 Dec 2019 02:44 )  Alb: 3.6 g/dL / Pro: 6.6 g/dL / ALK PHOS: 81 U/L / ALT: 15 U/L / AST: 19 U/L / GGT: x           PT/INR - ( 09 Dec 2019 02:44 )   PT: 11.4 sec;   INR: 0.99 ratio         PTT - ( 09 Dec 2019 11:25 )  PTT:62.3 sec        HEALTH ISSUES - PROBLEM Dx:  NSTEMI (non-ST elevated myocardial infarction): NSTEMI (non-ST elevated myocardial infarction)

## 2019-12-10 NOTE — DISCHARGE NOTE PROVIDER - CARE PROVIDER_API CALL
Alan Clark ()  Cardiology; Internal Medicine  [address] [place] [address]  Phone: [phone]  Fax: [phone]  Follow Up Time:

## 2019-12-10 NOTE — DISCHARGE NOTE PROVIDER - NSDCCPTREATMENT_GEN_ALL_CORE_FT
PRINCIPAL PROCEDURE  Procedure: Percutaneous coronary intervention (PCI) for acute myocardial infarction  Findings and Treatment:       SECONDARY PROCEDURE  Procedure: Transthoracic echocardiogram  Findings and Treatment:

## 2019-12-10 NOTE — DISCHARGE NOTE NURSING/CASE MANAGEMENT/SOCIAL WORK - PATIENT PORTAL LINK FT
You can access the FollowMyHealth Patient Portal offered by [facility] by registering at the following website: [email or web address]. By joining [facility]’s FollowMyHealth portal, you will also be able to view your health information using other applications (apps) compatible with our system.

## 2019-12-10 NOTE — DISCHARGE NOTE PROVIDER - HOSPITAL COURSE
64F Hx of strong family h/o early CAD, borderline HTN, IBS, GERD, hernia s/p repair w/ mesh, known lipomas on pancreatis/liver; transferred from [facility] for NSTEMI briefly requiring Nitro gtt ,now on hydralazine. TTE with Ef 62%, Mild segmental left ventricular systolic dysfunction. Mild diastolic dysfunction (Stage I). s/p LHC today , STEPHEN to 70-80% mLAD via right radial. On DAPT and low dose BB 64F Hx of strong family h/o early CAD, borderline HTN, IBS, GERD, hernia s/p repair w/ mesh, known lipomas on pancreatis/liver; transferred from [facility] for NSTEMI briefly requiring Nitro gtt ,now on hydralazine. TTE with Ef 62%, Mild segmental left ventricular systolic dysfunction. Mild diastolic dysfunction (Stage I). s/p LHC today , STEPHEN to 70-80% mLAD via right radial. On DAPT and low dose BB; Hydralazine stopped started on ACEi; Cardiac enzymes downtrended.  Ambulated without difficulty. Discharged home in stable condition.

## 2019-12-10 NOTE — DISCHARGE NOTE PROVIDER - NSDCMRMEDTOKEN_GEN_ALL_CORE_FT
aspirin 81 mg oral delayed release tablet: 1 tab(s) orally once a day  atorvastatin 80 mg oral tablet: 1 tab(s) orally once a day (at bedtime)  clopidogrel 75 mg oral tablet: 1 tab(s) orally once a day  lisinopril 5 mg oral tablet: 1 tab(s) orally once a day  pantoprazole 40 mg oral delayed release tablet: 1 tab(s) orally once a day (before a meal)  Toprol-XL 25 mg oral tablet, extended release: 1 tab(s) orally once a day

## 2019-12-10 NOTE — PROGRESS NOTE ADULT - ATTENDING COMMENTS
65 yo woman with NSTEMI    Continue AC with heparin, DAPT  Continue statin  LHC with Dr. Brunner
Patient is seen and examined with fellow, NP and the CCU house-staff. I agree with the history, physical and the assessment and plan.  nstemi awaiting cardiac cath  on DAPT  on hep gtt   TTE results  noted
Patient is seen and examined with fellow, NP and the CCU house-staff. I agree with the history, physical and the assessment and plan.  nstemi s/p pci  on dapt  tolerating bb and ace inh  stable to go home

## 2019-12-17 ENCOUNTER — TRANSCRIPTION ENCOUNTER (OUTPATIENT)
Age: 64
End: 2019-12-17

## 2019-12-30 RX ORDER — ASPIRIN ENTERIC COATED TABLETS 81 MG 81 MG/1
81 TABLET, DELAYED RELEASE ORAL
Qty: 30 | Refills: 1 | Status: ACTIVE | COMMUNITY
Start: 2019-12-30

## 2020-01-10 ENCOUNTER — MEDICATION RENEWAL (OUTPATIENT)
Age: 65
End: 2020-01-10

## 2020-01-17 ENCOUNTER — NON-APPOINTMENT (OUTPATIENT)
Age: 65
End: 2020-01-17

## 2020-01-17 ENCOUNTER — APPOINTMENT (OUTPATIENT)
Dept: CARDIOLOGY | Facility: CLINIC | Age: 65
End: 2020-01-17
Payer: COMMERCIAL

## 2020-01-17 VITALS
HEIGHT: 64 IN | BODY MASS INDEX: 26.29 KG/M2 | SYSTOLIC BLOOD PRESSURE: 135 MMHG | OXYGEN SATURATION: 98 % | HEART RATE: 48 BPM | DIASTOLIC BLOOD PRESSURE: 80 MMHG | WEIGHT: 154 LBS

## 2020-01-17 DIAGNOSIS — I21.4 NON-ST ELEVATION (NSTEMI) MYOCARDIAL INFARCTION: ICD-10-CM

## 2020-01-17 PROCEDURE — 93000 ELECTROCARDIOGRAM COMPLETE: CPT

## 2020-01-17 PROCEDURE — 99214 OFFICE O/P EST MOD 30 MIN: CPT

## 2020-01-30 PROBLEM — I21.4 NSTEMI (NON-ST ELEVATED MYOCARDIAL INFARCTION): Status: ACTIVE | Noted: 2019-12-18

## 2020-01-30 NOTE — HISTORY OF PRESENT ILLNESS
[FreeTextEntry1] : This is a 65 yo woman with pre-DM (HbA1C 5.9 12/2019), HLD on statin, CAD s/p NSTEMI recently with LAD STEPHEN placement is here as an initial evaluation post hospitalization 12/07/2019-12/10/2019.  Pt presented with NSTEMI at that time.  She subsequently underwent cardiac cath which showed LAD disease and STEPHEN was placed.  She had mild LV dysfunction on TTE.  Since the discharge she reports compliance with meds.  She denies any CP, SOB, WAN, LE edema, PND, orthopnea.

## 2020-01-30 NOTE — DISCUSSION/SUMMARY
[FreeTextEntry1] : This is a 63 yo woman with pre-DM (HbA1C 5.9 12/2019), HLD on statin, CAD s/p NSTEMI recently with LAD STEPHEN placement is here as an initial evaluation post hospitalization 12/07/2019-12/10/2019. Estefania is doing well post MI from the cardiac stand point.  \par \par CAD s/p NSTEMI with mild LV dysfunction\par -Continue ASA and Plavix\par -Continue BB, ACEi\par -Cardiac rehab referral provided\par \par HLD LDL 82\par -Continue statin.  Lipitor 80 mg po daily\par \par HTN\par -Slightly above goal today.  For now will continue to monitor.  Recommend ambulatory BP monitoring.  \par \par Follow with me in 2 months.  Extensive discussion regarding lifestyle changes, risk factor modifications, regular exercise, etc given her recent cardiac event.

## 2020-01-30 NOTE — REASON FOR VISIT
[Coronary Artery Disease] : coronary artery disease [Follow-Up - From Hospitalization] : follow-up of a recent hospitalization for [Prior Myocardial Infarction] : a prior myocardial infarction [Hyperlipidemia] : hyperlipidemia

## 2020-01-30 NOTE — REVIEW OF SYSTEMS
[Fever] : no fever [Headache] : no headache [Recent Weight Gain (___ Lbs)] : no recent weight gain [Feeling Fatigued] : feeling fatigued [Chills] : no chills [Recent Weight Loss (___ Lbs)] : no recent weight loss [Easy Bleeding] : no tendency for easy bleeding [Swollen Glands] : no swollen glands [Easy Bruising] : a tendency for easy bruising [Negative] : Endocrine

## 2020-01-30 NOTE — PHYSICAL EXAM
[General Appearance - Well Developed] : well developed [Normal Appearance] : normal appearance [Well Groomed] : well groomed [General Appearance - Well Nourished] : well nourished [Normal Conjunctiva] : the conjunctiva exhibited no abnormalities [General Appearance - In No Acute Distress] : no acute distress [Eyelids - No Xanthelasma] : the eyelids demonstrated no xanthelasmas [No Oral Pallor] : no oral pallor [No Oral Cyanosis] : no oral cyanosis [Normal Jugular Venous A Waves Present] : normal jugular venous A waves present [Normal Jugular Venous V Waves Present] : normal jugular venous V waves present [No Jugular Venous Basilio A Waves] : no jugular venous basilio A waves [Heart Rate And Rhythm] : heart rate and rhythm were normal [Murmurs] : no murmurs present [Heart Sounds] : normal S1 and S2 [Arterial Pulses Normal] : the arterial pulses were normal [Edema] : no peripheral edema present [Respiration, Rhythm And Depth] : normal respiratory rhythm and effort [Exaggerated Use Of Accessory Muscles For Inspiration] : no accessory muscle use [Abdomen Tenderness] : non-tender [Abdomen Soft] : soft [Bowel Sounds] : normal bowel sounds [Auscultation Breath Sounds / Voice Sounds] : lungs were clear to auscultation bilaterally [Abnormal Walk] : normal gait [Gait - Sufficient For Exercise Testing] : the gait was sufficient for exercise testing [Nail Clubbing] : no clubbing of the fingernails [Petechial Hemorrhages (___cm)] : no petechial hemorrhages [Cyanosis, Localized] : no localized cyanosis [Skin Turgor] : normal skin turgor [Skin Color & Pigmentation] : normal skin color and pigmentation [No Venous Stasis] : no venous stasis [Oriented To Time, Place, And Person] : oriented to person, place, and time [] : no rash [Affect] : the affect was normal [Impaired Insight] : insight and judgment were intact [Mood] : the mood was normal [No Anxiety] : not feeling anxious

## 2020-03-13 ENCOUNTER — APPOINTMENT (OUTPATIENT)
Dept: CARDIOLOGY | Facility: CLINIC | Age: 65
End: 2020-03-13
Payer: COMMERCIAL

## 2020-03-13 VITALS
OXYGEN SATURATION: 99 % | WEIGHT: 152 LBS | BODY MASS INDEX: 25.95 KG/M2 | HEART RATE: 56 BPM | HEIGHT: 64 IN | DIASTOLIC BLOOD PRESSURE: 77 MMHG | SYSTOLIC BLOOD PRESSURE: 119 MMHG

## 2020-03-13 PROCEDURE — 93000 ELECTROCARDIOGRAM COMPLETE: CPT

## 2020-03-13 PROCEDURE — 99214 OFFICE O/P EST MOD 30 MIN: CPT

## 2020-03-17 ENCOUNTER — NON-APPOINTMENT (OUTPATIENT)
Age: 65
End: 2020-03-17

## 2020-03-17 NOTE — REASON FOR VISIT
[Follow-Up - Clinic] : a clinic follow-up of [Coronary Artery Disease] : coronary artery disease [Hyperlipidemia] : hyperlipidemia [Prior Myocardial Infarction] : a prior myocardial infarction

## 2020-03-17 NOTE — HISTORY OF PRESENT ILLNESS
[FreeTextEntry1] : This is a 66 yo woman with pre-DM (HbA1C 5.9 12/2019), HLD on statin, stable CAD s/p NSTEMI  with LAD STEPHEN 12/2019.  Pt presented with NSTEMI at that time.  She subsequently underwent cardiac cath which showed LAD disease and STEPHEN was placed.  She had mild LV dysfunction on TTE.  Pt reports compliance with meds.  She denies any CP, SOB, WAN, LE edema, PND, orthopnea.  Unable to do cardiac rehab due to work schedule, but has been very active.

## 2020-03-17 NOTE — REVIEW OF SYSTEMS
[Fever] : no fever [Headache] : no headache [Recent Weight Gain (___ Lbs)] : no recent weight gain [Chills] : no chills [Feeling Fatigued] : not feeling fatigued [Recent Weight Loss (___ Lbs)] : no recent weight loss [Easy Bleeding] : no tendency for easy bleeding [Swollen Glands] : no swollen glands [Easy Bruising] : a tendency for easy bruising [Negative] : Endocrine

## 2020-03-17 NOTE — DISCUSSION/SUMMARY
[FreeTextEntry1] : This is a 64 yo woman with pre-DM (HbA1C 5.9 12/2019), HLD on statin, stable CAD s/p NSTEMI with LAD STEPHEN placement 12/2019. Estefania is doing well post MI from the cardiac stand point.  \par \par CAD s/p NSTEMI with mild LV dysfunction\par -Continue ASA and Plavix\par -Continue BB, ACEi\par \par HLD LDL 82\par -Continue statin.  Lipitor 80 mg po daily\par \par HTN\par -BP significantly improved since the last visit. Recommend ambulatory BP monitoring.  \par \par Follow with me in 3-4 months.  Discussed lifestyle changes, risk factor modifications, regular exercise, etc given her recent cardiac event.

## 2020-03-17 NOTE — PHYSICAL EXAM
[General Appearance - Well Developed] : well developed [Normal Appearance] : normal appearance [Well Groomed] : well groomed [General Appearance - Well Nourished] : well nourished [General Appearance - In No Acute Distress] : no acute distress [Normal Conjunctiva] : the conjunctiva exhibited no abnormalities [Eyelids - No Xanthelasma] : the eyelids demonstrated no xanthelasmas [No Oral Pallor] : no oral pallor [No Oral Cyanosis] : no oral cyanosis [Normal Jugular Venous A Waves Present] : normal jugular venous A waves present [Normal Jugular Venous V Waves Present] : normal jugular venous V waves present [No Jugular Venous Basilio A Waves] : no jugular venous basilio A waves [Respiration, Rhythm And Depth] : normal respiratory rhythm and effort [Exaggerated Use Of Accessory Muscles For Inspiration] : no accessory muscle use [Auscultation Breath Sounds / Voice Sounds] : lungs were clear to auscultation bilaterally [Heart Rate And Rhythm] : heart rate and rhythm were normal [Heart Sounds] : normal S1 and S2 [Murmurs] : no murmurs present [Arterial Pulses Normal] : the arterial pulses were normal [Edema] : no peripheral edema present [Bowel Sounds] : normal bowel sounds [Abdomen Soft] : soft [Abdomen Tenderness] : non-tender [Abnormal Walk] : normal gait [Gait - Sufficient For Exercise Testing] : the gait was sufficient for exercise testing [Nail Clubbing] : no clubbing of the fingernails [Cyanosis, Localized] : no localized cyanosis [Petechial Hemorrhages (___cm)] : no petechial hemorrhages [Skin Color & Pigmentation] : normal skin color and pigmentation [Skin Turgor] : normal skin turgor [] : no rash [No Venous Stasis] : no venous stasis [Oriented To Time, Place, And Person] : oriented to person, place, and time [Impaired Insight] : insight and judgment were intact [Affect] : the affect was normal [Mood] : the mood was normal [No Anxiety] : not feeling anxious

## 2020-04-25 ENCOUNTER — MESSAGE (OUTPATIENT)
Age: 65
End: 2020-04-25

## 2020-04-30 LAB
SARS-COV-2 IGG SERPL IA-ACNC: <0.1 INDEX
SARS-COV-2 IGG SERPL QL IA: NEGATIVE

## 2020-07-02 ENCOUNTER — TRANSCRIPTION ENCOUNTER (OUTPATIENT)
Age: 65
End: 2020-07-02

## 2020-08-14 ENCOUNTER — APPOINTMENT (OUTPATIENT)
Dept: CARDIOLOGY | Facility: CLINIC | Age: 65
End: 2020-08-14
Payer: COMMERCIAL

## 2020-08-14 VITALS
SYSTOLIC BLOOD PRESSURE: 155 MMHG | OXYGEN SATURATION: 98 % | DIASTOLIC BLOOD PRESSURE: 83 MMHG | BODY MASS INDEX: 25.58 KG/M2 | WEIGHT: 149 LBS | HEART RATE: 54 BPM

## 2020-08-14 PROCEDURE — 99214 OFFICE O/P EST MOD 30 MIN: CPT

## 2020-08-14 PROCEDURE — 93000 ELECTROCARDIOGRAM COMPLETE: CPT

## 2020-08-14 NOTE — PHYSICAL EXAM
[General Appearance - Well Developed] : well developed [Well Groomed] : well groomed [General Appearance - Well Nourished] : well nourished [Normal Appearance] : normal appearance [Eyelids - No Xanthelasma] : the eyelids demonstrated no xanthelasmas [Normal Conjunctiva] : the conjunctiva exhibited no abnormalities [General Appearance - In No Acute Distress] : no acute distress [FreeTextEntry1] : pt is wearing a mask  [Normal Jugular Venous A Waves Present] : normal jugular venous A waves present [Normal Jugular Venous V Waves Present] : normal jugular venous V waves present [No Jugular Venous Basilio A Waves] : no jugular venous basilio A waves [Auscultation Breath Sounds / Voice Sounds] : lungs were clear to auscultation bilaterally [Respiration, Rhythm And Depth] : normal respiratory rhythm and effort [Exaggerated Use Of Accessory Muscles For Inspiration] : no accessory muscle use [Heart Sounds] : normal S1 and S2 [Heart Rate And Rhythm] : heart rate and rhythm were normal [Murmurs] : no murmurs present [Arterial Pulses Normal] : the arterial pulses were normal [Edema] : no peripheral edema present [Abdomen Soft] : soft [Bowel Sounds] : normal bowel sounds [Abnormal Walk] : normal gait [Abdomen Tenderness] : non-tender [Nail Clubbing] : no clubbing of the fingernails [Gait - Sufficient For Exercise Testing] : the gait was sufficient for exercise testing [Cyanosis, Localized] : no localized cyanosis [Skin Color & Pigmentation] : normal skin color and pigmentation [Petechial Hemorrhages (___cm)] : no petechial hemorrhages [Skin Turgor] : normal skin turgor [] : no rash [No Venous Stasis] : no venous stasis [Impaired Insight] : insight and judgment were intact [Oriented To Time, Place, And Person] : oriented to person, place, and time [Mood] : the mood was normal [No Anxiety] : not feeling anxious [Affect] : the affect was normal

## 2020-08-14 NOTE — REASON FOR VISIT
[Coronary Artery Disease] : coronary artery disease [Follow-Up - Clinic] : a clinic follow-up of [Prior Myocardial Infarction] : a prior myocardial infarction [Hyperlipidemia] : hyperlipidemia [Hypertension] : hypertension

## 2020-08-14 NOTE — REVIEW OF SYSTEMS
[Fever] : no fever [Headache] : no headache [Recent Weight Gain (___ Lbs)] : no recent weight gain [Chills] : no chills [Feeling Fatigued] : not feeling fatigued [Easy Bleeding] : no tendency for easy bleeding [Recent Weight Loss (___ Lbs)] : no recent weight loss [Swollen Glands] : no swollen glands [Easy Bruising] : a tendency for easy bruising [Negative] : Endocrine

## 2020-08-14 NOTE — DISCUSSION/SUMMARY
[FreeTextEntry1] : This is a 64 yo woman with pre-DM (HbA1C 6.0 2020 ), HLD on statin, stable CAD s/p NSTEMI with LAD STEPHEN placement 12/2019. Estefania is doing well post MI from the cardiac stand point.  \par \par CAD s/p NSTEMI with mild LV dysfunction\par -Continue ASA and Plavix\par -Continue BB, ACEi\par \par HLD LDL 82\par -Continue statin.  Lipitor 80 mg po daily\par \par HTN\par -BP above goal today.  We decided she will do ambulatory monitoring for a week and then contact me with BP numbers and decide on further management.  I do suspect she will need further increase of dose of meds.  \par \par Pre-DM (increased HbA1C from prior).  She has episodes of significant symptomatic hypoglycemia with FS 20s.  Suggested small frequent meals thought a day, endo referral provided.  \par \par Follow with me in 4 months.  Discussed lifestyle changes, risk factor modifications, regular exercise, etc given her cardiac event.

## 2020-08-14 NOTE — HISTORY OF PRESENT ILLNESS
[FreeTextEntry1] : This is a 66 yo woman with pre-DM (HbA1C 6.0 2020), HLD on statin, stable CAD s/p NSTEMI  with LAD STEPHEN 12/2019.  Pt presented with NSTEMI back in December, 2019.  She subsequently underwent cardiac cath which showed LAD disease and STEPHEN was placed.  She had mild LV dysfunction on TTE.  Pt reports compliance with meds.  She denies any CP, SOB, WAN, LE edema, PND, orthopnea.

## 2020-09-01 ENCOUNTER — APPOINTMENT (OUTPATIENT)
Dept: ENDOCRINOLOGY | Facility: CLINIC | Age: 65
End: 2020-09-01
Payer: COMMERCIAL

## 2020-09-01 VITALS
HEIGHT: 64 IN | HEART RATE: 66 BPM | SYSTOLIC BLOOD PRESSURE: 130 MMHG | BODY MASS INDEX: 25.44 KG/M2 | DIASTOLIC BLOOD PRESSURE: 90 MMHG | WEIGHT: 149 LBS | OXYGEN SATURATION: 98 %

## 2020-09-01 PROCEDURE — 99202 OFFICE O/P NEW SF 15 MIN: CPT

## 2020-09-02 NOTE — ASSESSMENT
[FreeTextEntry1] : 64 yo F with PMH CAD NSTEMI, HTN, HLD, compression fractures, prediabetes, hypoglycemia, pancreatic mass, osteoporosis\par \par 1. Hypoglycemia/pancreatic mass - will refer for fasting work up.\par \par

## 2020-09-02 NOTE — HISTORY OF PRESENT ILLNESS
[FreeTextEntry1] : 66 yo F with PMH CAD NSTEMI, HTN, HLD, compression fractures, prediabetes, hypoglycemia, pancreatic mass, osteoporosis\par \par Reports h/o hypoglycemia to the 40s and < 20\par fasting and post prandial \par fulfills [facility] triad\par MRI Abdomen 10/2019 showed fatty mass 4.4 x 2.3 cm also 0.8 cm lesion\par this mass was diagnosed 2013\par Dr. Coulter [phone]\par also with history of prediabetes\par

## 2020-11-05 LAB
ACTH SER-ACNC: 11.9 PG/ML
ALBUMIN SERPL ELPH-MCNC: 3.9 G/DL
ALP BLD-CCNC: 79 U/L
ALT SERPL-CCNC: 19 U/L
ANION GAP SERPL CALC-SCNC: 10 MMOL/L
AST SERPL-CCNC: 18 U/L
B-OH-BUTYR SERPL-SCNC: 0.1 MMOL/L
BILIRUB SERPL-MCNC: 0.4 MG/DL
BUN SERPL-MCNC: 17 MG/DL
C PEPTIDE SERPL-MCNC: 2.4 NG/ML
CALCIUM SERPL-MCNC: 9 MG/DL
CHLORIDE SERPL-SCNC: 106 MMOL/L
CO2 SERPL-SCNC: 24 MMOL/L
CORTICOSTEROID BIND GLOBULIN: 2.1 MG/DL
CORTIS SERPL-MCNC: 9.2 UG/DL
CREAT SERPL-MCNC: 0.7 MG/DL
GLUCOSE BLDC GLUCOMTR-MCNC: 125
GLUCOSE SERPL-MCNC: 109 MG/DL
INSULIN AB SER IA-ACNC: <0.4 U/ML
INSULIN P FAST SERPL-ACNC: 4.8 UU/ML
POTASSIUM SERPL-SCNC: 4.4 MMOL/L
PROINSULIN SERPL-MCNC: 4.4 PMOL/L
PROT SERPL-MCNC: 5.7 G/DL
SODIUM SERPL-SCNC: 139 MMOL/L
SULFONYLUREA SERPL-MCNC: NORMAL

## 2020-11-24 ENCOUNTER — APPOINTMENT (OUTPATIENT)
Dept: PRIMARY CARE | Facility: HOSPITAL | Age: 65
End: 2020-11-24

## 2020-11-24 ENCOUNTER — OUTPATIENT (OUTPATIENT)
Dept: OUTPATIENT SERVICES | Facility: HOSPITAL | Age: 65
LOS: 1 days | End: 2020-11-24

## 2020-11-24 VITALS
OXYGEN SATURATION: 100 % | HEIGHT: 64 IN | WEIGHT: 150 LBS | DIASTOLIC BLOOD PRESSURE: 90 MMHG | TEMPERATURE: 97.8 F | SYSTOLIC BLOOD PRESSURE: 151 MMHG | BODY MASS INDEX: 25.61 KG/M2 | HEART RATE: 65 BPM

## 2020-11-24 DIAGNOSIS — Z20.828 CONTACT WITH AND (SUSPECTED) EXPOSURE TO OTHER VIRAL COMMUNICABLE DISEASES: ICD-10-CM

## 2020-11-24 DIAGNOSIS — Z98.89 OTHER SPECIFIED POSTPROCEDURAL STATES: Chronic | ICD-10-CM

## 2020-11-24 DIAGNOSIS — Z90.710 ACQUIRED ABSENCE OF BOTH CERVIX AND UTERUS: Chronic | ICD-10-CM

## 2020-11-24 LAB — SARS-COV-2 N GENE NPH QL NAA+PROBE: NOT DETECTED

## 2021-01-06 NOTE — HISTORY OF PRESENT ILLNESS
[FreeTextEntry8] : 65 F allergy of latex, PMH  of HTN and PSH of TIA with stent in 2019 [facility].\par \par States that she wants to get COVID swab before her family member for Thanksgiving.  Denies any fever, cough, sore throat or SOB. No loss of smell or taste, no chest tightness, or any GI related symptoms of nausea, vomiting or diarrhea. \par \par She works  in [employer]. Does not  take regular maintenance medications. Denies any chest pain, no chest palpitations or any respiratory distress\par \par \par \par

## 2021-01-08 ENCOUNTER — NON-APPOINTMENT (OUTPATIENT)
Age: 66
End: 2021-01-08

## 2021-01-08 ENCOUNTER — APPOINTMENT (OUTPATIENT)
Dept: CARDIOLOGY | Facility: CLINIC | Age: 66
End: 2021-01-08
Payer: COMMERCIAL

## 2021-01-08 VITALS
BODY MASS INDEX: 25.1 KG/M2 | WEIGHT: 147 LBS | DIASTOLIC BLOOD PRESSURE: 81 MMHG | SYSTOLIC BLOOD PRESSURE: 129 MMHG | HEART RATE: 53 BPM | OXYGEN SATURATION: 100 % | HEIGHT: 64 IN

## 2021-01-08 PROCEDURE — 93000 ELECTROCARDIOGRAM COMPLETE: CPT

## 2021-01-08 PROCEDURE — 99213 OFFICE O/P EST LOW 20 MIN: CPT

## 2021-01-08 PROCEDURE — 99072 ADDL SUPL MATRL&STAF TM PHE: CPT

## 2021-01-08 RX ORDER — CLOPIDOGREL BISULFATE 75 MG/1
75 TABLET, FILM COATED ORAL DAILY
Qty: 1 | Refills: 3 | Status: DISCONTINUED | COMMUNITY
Start: 2019-12-30 | End: 2021-01-08

## 2021-01-11 NOTE — HISTORY OF PRESENT ILLNESS
[FreeTextEntry1] : This is a 64 yo woman with pre-DM (HbA1C 6.0 2020), HLD on statin, stable CAD s/p NSTEMI  with LAD STEPHEN 12/2019.  Pt presented with NSTEMI back in December, 2019.  She subsequently underwent cardiac cath which showed LAD disease and STEPHEN was placed.  She had mild LV dysfunction on TTE.  Pt reports compliance with meds.  She denies any CP, SOB, WAN, LE edema, PND, orthopnea.

## 2021-01-11 NOTE — PHYSICAL EXAM
[General Appearance - Well Developed] : well developed [Well Groomed] : well groomed [Normal Appearance] : normal appearance [General Appearance - Well Nourished] : well nourished [Normal Conjunctiva] : the conjunctiva exhibited no abnormalities [General Appearance - In No Acute Distress] : no acute distress [Eyelids - No Xanthelasma] : the eyelids demonstrated no xanthelasmas [Normal Jugular Venous A Waves Present] : normal jugular venous A waves present [Normal Jugular Venous V Waves Present] : normal jugular venous V waves present [No Jugular Venous Basilio A Waves] : no jugular venous basilio A waves [Respiration, Rhythm And Depth] : normal respiratory rhythm and effort [Exaggerated Use Of Accessory Muscles For Inspiration] : no accessory muscle use [Auscultation Breath Sounds / Voice Sounds] : lungs were clear to auscultation bilaterally [Heart Rate And Rhythm] : heart rate and rhythm were normal [Heart Sounds] : normal S1 and S2 [Murmurs] : no murmurs present [Arterial Pulses Normal] : the arterial pulses were normal [Edema] : no peripheral edema present [Bowel Sounds] : normal bowel sounds [Abdomen Tenderness] : non-tender [Abdomen Soft] : soft [Abnormal Walk] : normal gait [Gait - Sufficient For Exercise Testing] : the gait was sufficient for exercise testing [Nail Clubbing] : no clubbing of the fingernails [Cyanosis, Localized] : no localized cyanosis [Petechial Hemorrhages (___cm)] : no petechial hemorrhages [Skin Color & Pigmentation] : normal skin color and pigmentation [Skin Turgor] : normal skin turgor [No Venous Stasis] : no venous stasis [] : no rash [Oriented To Time, Place, And Person] : oriented to person, place, and time [Impaired Insight] : insight and judgment were intact [Affect] : the affect was normal [Mood] : the mood was normal [No Anxiety] : not feeling anxious [FreeTextEntry1] : pt is wearing a mask

## 2021-01-11 NOTE — REVIEW OF SYSTEMS
[Easy Bruising] : a tendency for easy bruising [Negative] : Endocrine [Fever] : no fever [Headache] : no headache [Recent Weight Gain (___ Lbs)] : no recent weight gain [Chills] : no chills [Feeling Fatigued] : not feeling fatigued [Recent Weight Loss (___ Lbs)] : no recent weight loss [Easy Bleeding] : no tendency for easy bleeding [Swollen Glands] : no swollen glands

## 2021-01-11 NOTE — DISCUSSION/SUMMARY
[FreeTextEntry1] : This is a 64 yo woman with pre-DM (HbA1C 6.0 2020 ), HLD on statin, stable CAD s/p NSTEMI with LAD STEPHEN placement 12/2019. Estefania is doing well from the cardiac stand point.  \par \par CAD s/p NSTEMI with mild LV dysfunction\par -Continue ASA.  Will stop Plavix at this time.  \par -Continue BB, ACEi\par \par HLD LDL 82\par -Continue statin.  Lipitor 80 mg po daily\par \par HTN controlled.  \par -BP at goal. \par \par Pre-DM  \par She had episodes of significant symptomatic hypoglycemia with FS 20s. Pt was seen by endocrine.  Recommend regular follow up.  Estefania reports that episodes of hypoglycemia has resolved.  \par \par Follow with me in 6 months.  Discussed lifestyle changes, risk factor modifications, regular exercise, etc given her cardiac event.

## 2021-03-20 ENCOUNTER — EMERGENCY (EMERGENCY)
Facility: HOSPITAL | Age: 66
LOS: 1 days | Discharge: HOME | End: 2021-03-20
Attending: HOSPITALIST | Admitting: HOSPITALIST
Payer: OTHER MISCELLANEOUS

## 2021-03-20 VITALS
DIASTOLIC BLOOD PRESSURE: 98 MMHG | RESPIRATION RATE: 17 BRPM | HEART RATE: 71 BPM | SYSTOLIC BLOOD PRESSURE: 142 MMHG | HEIGHT: 64 IN | OXYGEN SATURATION: 97 % | TEMPERATURE: 98 F

## 2021-03-20 DIAGNOSIS — Z90.710 ACQUIRED ABSENCE OF BOTH CERVIX AND UTERUS: Chronic | ICD-10-CM

## 2021-03-20 DIAGNOSIS — Z98.89 OTHER SPECIFIED POSTPROCEDURAL STATES: Chronic | ICD-10-CM

## 2021-03-20 PROCEDURE — 72125 CT NECK SPINE W/O DYE: CPT | Mod: 26

## 2021-03-20 PROCEDURE — 70486 CT MAXILLOFACIAL W/O DYE: CPT | Mod: 26

## 2021-03-20 PROCEDURE — 99285 EMERGENCY DEPT VISIT HI MDM: CPT

## 2021-03-20 PROCEDURE — 70450 CT HEAD/BRAIN W/O DYE: CPT | Mod: 26

## 2021-03-20 PROCEDURE — 73564 X-RAY EXAM KNEE 4 OR MORE: CPT | Mod: 26,LT

## 2021-03-20 RX ORDER — ACETAMINOPHEN 500 MG
650 TABLET ORAL ONCE
Refills: 0 | Status: COMPLETED | OUTPATIENT
Start: 2021-03-20 | End: 2021-03-20

## 2021-03-20 RX ADMIN — Medication 650 MILLIGRAM(S): at 18:34

## 2021-03-20 NOTE — ED PROVIDER NOTE - PATIENT PORTAL LINK FT
You can access the FollowMyHealth Patient Portal offered by [facility] by registering at the following website: [email or web address]. By joining [employer]’s FollowMyHealth portal, you will also be able to view your health information using other applications (apps) compatible with our system.

## 2021-03-20 NOTE — ED PROVIDER NOTE - CLINICAL SUMMARY MEDICAL DECISION MAKING FREE TEXT BOX
67 y/o female with a hx of MI with a stent, HTN, presents to the ER s/p mechanical slip and fall c/o facial/nose pain, left knee pain.  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 Pt is well appearing, NAD, normal neuro exam, will obtain CT head, CT maxillofacial, CT cervical spine, xray left knee, pt offered and declined Tetanus.

## 2021-03-20 NOTE — ED PROVIDER NOTE - ATTENDING CONTRIBUTION TO CARE
66F with hx of MI, CAD s/p stent, HTN p/w mechanical slip and fall while working. patient tripped and landed on her face and knee just pta. no loc. + bleeding from right nostril. no loose teeth or jaw misalignment. does take ASA 162mg daily.     ***GEN - NAD; well appearing; A+O x3 ***HEAD - NC/AT ***EYES/NOSE - dried blood to right naris. no nasal septal hematoma. PERRL, EOMI, mucous membranes moist, no discharge ***THROAT: no loose teeth, normal jaw alignment. Oral cavity and pharynx normal. No inflammation, swelling, exudate, or lesions.  ***NECK: Neck supple, non-tender without lymphadenopathy, no masses, no thyromegaly.   ***PULMONARY - CTA b/l, symmetric breath sounds. ***CARDIAC -s1s2, RRR, no M,G,R  ***ABDOMEN - +BS, ND, NT, soft, no guarding, no rebound, no masses   ***BACK - no CVA tenderness, Normal  spine ***EXTREMITIES - symmetric pulses, 2+ dp, capillary refill < 2 seconds, no clubbing, no cyanosis, no edema ***SKIN - no rash or bruising   ***NEUROLOGIC - alert, CN 2-12 intact    MDM: 66F with mechanical trip and fall. cth, c-spine, max-face.

## 2021-03-20 NOTE — ED PROVIDER NOTE - OBJECTIVE STATEMENT
67 y/o female with a hx of MI with a stent, HTN, presents to the ER s/p mechanical slip and fall c/o facial/nose pain, left knee pain.  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  Pt denies loc, weakness, dizziness, chest pain, shortness of breath, neck pain, back pain, numbness, tingling.  Tetanus status unknown.

## 2021-03-20 NOTE — ED PROVIDER NOTE - PHYSICAL EXAMINATION
Nursing
Left knee: NV intact, FROM, superficial abrasion noted to anterior aspect of knee, normal gait    +TTP at nose will swelling and area of ecchymosis, small amount of blood noted to right naris, no septal hematoma, no active bleeding.

## 2021-03-20 NOTE — ED ADULT TRIAGE NOTE - CHIEF COMPLAINT QUOTE
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 Pt hit face on floor, nose noted to be swollen and bruised. Pt endorses left knee pain as well. Pt denies loc. Pt takes ASA daily.

## 2021-05-07 ENCOUNTER — RESULT REVIEW (OUTPATIENT)
Age: 66
End: 2021-05-07

## 2021-05-18 ENCOUNTER — APPOINTMENT (OUTPATIENT)
Dept: UROLOGY | Facility: CLINIC | Age: 66
End: 2021-05-18
Payer: COMMERCIAL

## 2021-05-18 VITALS — BODY MASS INDEX: 34.02 KG/M2 | WEIGHT: 147 LBS | TEMPERATURE: 98.5 F | HEIGHT: 55 IN

## 2021-05-18 DIAGNOSIS — R31.0 GROSS HEMATURIA: ICD-10-CM

## 2021-05-18 DIAGNOSIS — R82.90 UNSPECIFIED ABNORMAL FINDINGS IN URINE: ICD-10-CM

## 2021-05-18 PROCEDURE — 99203 OFFICE O/P NEW LOW 30 MIN: CPT

## 2021-05-18 PROCEDURE — 99072 ADDL SUPL MATRL&STAF TM PHE: CPT

## 2021-05-18 RX ORDER — DENOSUMAB 60 MG/ML
60 INJECTION SUBCUTANEOUS
Refills: 0 | Status: ACTIVE | COMMUNITY
Start: 2021-05-18

## 2021-05-18 RX ORDER — PANTOPRAZOLE 40 MG/1
40 TABLET, DELAYED RELEASE ORAL DAILY
Qty: 30 | Refills: 2 | Status: DISCONTINUED | COMMUNITY
Start: 2019-12-30 | End: 2021-05-18

## 2021-05-18 NOTE — HISTORY OF PRESENT ILLNESS
[Hematuria - Gross] : gross hematuria [None] : None [FreeTextEntry1] : Was admitted to [facility] on May 7th  due to left arm weakness. During the hospital stay pt noted to have nitrite positive urine and was given IV Rochephin and sent home on oral Keflex. She completed antibiotics last week. She has noticed blood in her urine since last Friday and some small clots. Urine clear this AM. Some urge incontinence, slight burn at end of urination, sometimes has to position self to start urine flow.\par \par Nonsmoker\par \par Age 48 sling, hysterectomy, partial oophorectomy

## 2021-05-18 NOTE — ASSESSMENT
[FreeTextEntry1] : Gross hematuria\par \par Will send for CT Urogram\par RTO 2-3 weeks for Cysto\par \par Abnormal urine\par \par Urine dip> nitrite positive, large blood\par Lab: Urine culture, will wait or results to treat\par \par

## 2021-05-19 LAB
BILIRUB UR QL STRIP: NEGATIVE
CLARITY UR: CLEAR
COLLECTION METHOD: NORMAL
GLUCOSE UR-MCNC: NEGATIVE
HCG UR QL: 1 EU/DL
HGB UR QL STRIP.AUTO: NORMAL
KETONES UR-MCNC: NEGATIVE
LEUKOCYTE ESTERASE UR QL STRIP: NORMAL
NITRITE UR QL STRIP: POSITIVE
PH UR STRIP: 5.5
PROT UR STRIP-MCNC: NEGATIVE
SP GR UR STRIP: 1.02
URINE CYTOLOGY: NORMAL

## 2021-05-24 LAB — BACTERIA UR CULT: ABNORMAL

## 2021-05-25 DIAGNOSIS — N39.0 URINARY TRACT INFECTION, SITE NOT SPECIFIED: ICD-10-CM

## 2021-05-25 RX ORDER — NITROFURANTOIN (MONOHYDRATE/MACROCRYSTALS) 25; 75 MG/1; MG/1
100 CAPSULE ORAL
Qty: 10 | Refills: 0 | Status: ACTIVE | COMMUNITY
Start: 2021-05-25 | End: 1900-01-01

## 2021-05-28 ENCOUNTER — OUTPATIENT (OUTPATIENT)
Dept: OUTPATIENT SERVICES | Facility: HOSPITAL | Age: 66
LOS: 1 days | End: 2021-05-28
Payer: COMMERCIAL

## 2021-05-28 ENCOUNTER — APPOINTMENT (OUTPATIENT)
Dept: CT IMAGING | Facility: CLINIC | Age: 66
End: 2021-05-28
Payer: COMMERCIAL

## 2021-05-28 DIAGNOSIS — Z98.89 OTHER SPECIFIED POSTPROCEDURAL STATES: Chronic | ICD-10-CM

## 2021-05-28 DIAGNOSIS — Z00.8 ENCOUNTER FOR OTHER GENERAL EXAMINATION: ICD-10-CM

## 2021-05-28 DIAGNOSIS — Z90.710 ACQUIRED ABSENCE OF BOTH CERVIX AND UTERUS: Chronic | ICD-10-CM

## 2021-05-28 DIAGNOSIS — R31.0 GROSS HEMATURIA: ICD-10-CM

## 2021-05-28 PROCEDURE — 74178 CT ABD&PLV WO CNTR FLWD CNTR: CPT | Mod: 26

## 2021-05-28 PROCEDURE — 74178 CT ABD&PLV WO CNTR FLWD CNTR: CPT

## 2021-06-08 ENCOUNTER — APPOINTMENT (OUTPATIENT)
Dept: UROLOGY | Facility: CLINIC | Age: 66
End: 2021-06-08
Payer: COMMERCIAL

## 2021-06-08 VITALS
TEMPERATURE: 97.5 F | HEART RATE: 55 BPM | RESPIRATION RATE: 15 BRPM | SYSTOLIC BLOOD PRESSURE: 139 MMHG | HEIGHT: 55 IN | DIASTOLIC BLOOD PRESSURE: 76 MMHG

## 2021-06-08 DIAGNOSIS — N30.90 CYSTITIS, UNSPECIFIED W/OUT HEMATURIA: ICD-10-CM

## 2021-06-08 PROCEDURE — 52000 CYSTOURETHROSCOPY: CPT

## 2021-06-08 PROCEDURE — 99072 ADDL SUPL MATRL&STAF TM PHE: CPT

## 2021-06-08 RX ORDER — NITROFURANTOIN MACROCRYSTALS 50 MG/1
50 CAPSULE ORAL
Qty: 30 | Refills: 0 | Status: ACTIVE | COMMUNITY
Start: 2021-06-08 | End: 1900-01-01

## 2021-07-06 ENCOUNTER — APPOINTMENT (OUTPATIENT)
Dept: UROLOGY | Facility: CLINIC | Age: 66
End: 2021-07-06

## 2021-07-09 ENCOUNTER — NON-APPOINTMENT (OUTPATIENT)
Age: 66
End: 2021-07-09

## 2021-07-09 ENCOUNTER — APPOINTMENT (OUTPATIENT)
Dept: CARDIOLOGY | Facility: CLINIC | Age: 66
End: 2021-07-09
Payer: COMMERCIAL

## 2021-07-09 VITALS — DIASTOLIC BLOOD PRESSURE: 81 MMHG | OXYGEN SATURATION: 98 % | HEART RATE: 46 BPM | SYSTOLIC BLOOD PRESSURE: 137 MMHG

## 2021-07-09 PROCEDURE — 93000 ELECTROCARDIOGRAM COMPLETE: CPT

## 2021-07-09 PROCEDURE — 99072 ADDL SUPL MATRL&STAF TM PHE: CPT

## 2021-07-09 PROCEDURE — 99213 OFFICE O/P EST LOW 20 MIN: CPT

## 2021-07-12 ENCOUNTER — APPOINTMENT (OUTPATIENT)
Dept: NEUROLOGY | Facility: CLINIC | Age: 66
End: 2021-07-12
Payer: COMMERCIAL

## 2021-07-12 VITALS
HEIGHT: 55 IN | SYSTOLIC BLOOD PRESSURE: 154 MMHG | DIASTOLIC BLOOD PRESSURE: 86 MMHG | HEART RATE: 60 BPM | WEIGHT: 148 LBS | BODY MASS INDEX: 34.25 KG/M2

## 2021-07-12 PROCEDURE — 99205 OFFICE O/P NEW HI 60 MIN: CPT

## 2021-07-12 PROCEDURE — 99072 ADDL SUPL MATRL&STAF TM PHE: CPT

## 2021-07-12 NOTE — HISTORY OF PRESENT ILLNESS
[FreeTextEntry1] : Reason for consult: possible MS\par \par HPI: GRACIELA CABRERA is a 66 year old woman \par \par 5/2021 - numbness in her L neck, not painful. the next day, progressed to involve left arm and leg, not face. \par Admitted to [facility]. Got MRIs and LP. Found lesions. No discussion of IVMP. Went to neurologist Dr. Tafoya who saw her in ED, was unsure if MS or not. \par Never neurological sx before. \par \par ROS/Current Sx:\par left sided numbness\par chronic cramping in middle of night\par pains in knees and shins, stabbing, mostly at night.\par no BB dysfunction\par \par PMHX:\par CAD s/p stent\par HLD\par osteoporosis\par HTN\par \par MEDS:\par lipitor 80\par prolia\par lisinopril\par asa81\par MV\par \par ALL: latex\par \par SHx: no etoh, no drugs, no tob. \par \par FHx: sister with MS, diagnosed late at 56yo; doing "so-so", frequent falls. brother at MI at young age.\par \par Vitals: hypertensive, advised to discuss w pmd.\par \par Exam:\par \par AO3.  Normally conversant.  Follows commands, names, and repeats.  Good attention.\par \par PERRL, VFF, EOMI, no nystagmus, face symmetric, TUP at midline.\par \par Motor: \par                                                 R:                               L:\par Del                                           5                                5\par Bi                                              5                               5\par Tri                                            5                               5\par Wrist Extensors                      5                               5\par Finger abductors                    5                               5\par                                         5                               5 \par \par HF                                           5                               5\par KE                                           5                               5\par KF                                           5                               5\par DF                                           5                               5\par PF                                           5                               5\par \par Tone                                       R                               L\par UE                                          0                                0 \par LE                                          0                                0\par \par Sensory                                RUE                      LUE                 RLE                LLE     \par LT                                           +                            +                      +                   +\par Vib                                          +                            +                      mod                  mod\par JPS                                         +                            +                      +                   +\par PP                                         +                            +                      +                   +\par Temp                                     +                            +                      +                   +\par \par Reflexes:\par                                              R                             L                            \par Biceps                                  0                            0\par BR                                        0                             0\par Triceps                              \par Pat                                        2                            2 \par AJ                                       3                            abs\par \par TOES                                   mute                      mute\par \par Coordination:\par                                              R                             L                       \par FTN                                       0                             0 \par MERRILL                                      0                            0\par HTS                                      0                             0 \par \par Other                                                                          \par  \par Gait: normal, can heel, toe, tandem with ease\par \par                     Assistance: none\par \par CSF 5/2021 \par absent OCBs\par MBP nl\par \par \par MRI brain 5/2021 - read as PV lesions and AMBAR lesion, nonenhanicng\par \par MRI C spine 5/2021 - read as normal cord, some degen spine disease.\par \par \par AP:  65yo w/ sister with MS who had acute onset of L neck/arm/leg numbness since 5/2021. Unclear etiology. MRI brain demonstrated some lesions that were considered potentially suggestive of MS. However, there were no cervical cord lesions. OCBs were negative in 5/2021. \par \par Based on absence of OCBs and cervical cord lesions, coupled with pt's age and low likelihood of acute MS exacerbation at this age, I doubt MS. However, will need further evaluation.\par \par all questions answered, management discussed, education provided.\par \par - pt to provide me with MRIs on CD of brain and C spine for my review. then we will discuss next course of action.\par \par \par

## 2021-07-21 NOTE — HISTORY OF PRESENT ILLNESS
[FreeTextEntry1] : This is a 65 yo woman with pre-DM (HbA1C 6.0 2020), HLD on statin, stable CAD s/p NSTEMI  with LAD STEPHEN 12/2019.  Pt presented with NSTEMI back in December, 2019.  She subsequently underwent cardiac cath which showed LAD disease and STEPHEN was placed.  She had mild LV dysfunction on TTE.  Pt reports compliance with meds.  She denies any CP, SOB, WAN, LE edema, PND, orthopnea.  She reports being admitted to [facility] with arm and leg weakness, she was seen by neuro and is getting a second opinion given abn MRI ?MS given her sister has MS.  Pt also had a recent history of UTI and was evaluated by urology for hematuria.  Pt is wondering of Lipitor dose can be decreased.

## 2021-07-21 NOTE — DISCUSSION/SUMMARY
[FreeTextEntry1] : This is a 65 yo woman with pre-DM (HbA1C 6.0 2020 ), HLD on statin, stable CAD s/p NSTEMI with LAD STEPHEN placement 12/2019. Estefania is doing well from the cardiac stand point.  \par \par CAD s/p NSTEMI with mild LV dysfunction\par -Continue ASA.  \par -Continue  ACEi.  She is fairly bradycardic and will stop BB at this time.  \par \par HLD LDL 82\par -Continue statin.  Lipitor decreased to 40 mg po daily today.  \par \par HTN controlled.  \par -BP at goal. \par \par \par Follow with me in 6 months.  Discussed lifestyle changes, risk factor modifications, regular exercise, etc given her cardiac event.

## 2021-07-21 NOTE — PHYSICAL EXAM
[General Appearance - Well Developed] : well developed [Normal Appearance] : normal appearance [Well Groomed] : well groomed [General Appearance - Well Nourished] : well nourished [General Appearance - In No Acute Distress] : no acute distress [Normal Conjunctiva] : the conjunctiva exhibited no abnormalities [Eyelids - No Xanthelasma] : the eyelids demonstrated no xanthelasmas [Normal Jugular Venous A Waves Present] : normal jugular venous A waves present [Normal Jugular Venous V Waves Present] : normal jugular venous V waves present [No Jugular Venous Basilio A Waves] : no jugular venous basilio A waves [Respiration, Rhythm And Depth] : normal respiratory rhythm and effort [Exaggerated Use Of Accessory Muscles For Inspiration] : no accessory muscle use [Auscultation Breath Sounds / Voice Sounds] : lungs were clear to auscultation bilaterally [Heart Rate And Rhythm] : heart rate and rhythm were normal [Heart Sounds] : normal S1 and S2 [Murmurs] : no murmurs present [Arterial Pulses Normal] : the arterial pulses were normal [Edema] : no peripheral edema present [Bowel Sounds] : normal bowel sounds [Abdomen Soft] : soft [Abdomen Tenderness] : non-tender [Gait - Sufficient For Exercise Testing] : the gait was sufficient for exercise testing [Abnormal Walk] : normal gait [Nail Clubbing] : no clubbing of the fingernails [Cyanosis, Localized] : no localized cyanosis [Petechial Hemorrhages (___cm)] : no petechial hemorrhages [Skin Color & Pigmentation] : normal skin color and pigmentation [Skin Turgor] : normal skin turgor [] : no rash [No Venous Stasis] : no venous stasis [Oriented To Time, Place, And Person] : oriented to person, place, and time [Impaired Insight] : insight and judgment were intact [Affect] : the affect was normal [Mood] : the mood was normal [No Anxiety] : not feeling anxious [FreeTextEntry1] : pt is wearing a mask

## 2021-11-09 ENCOUNTER — APPOINTMENT (OUTPATIENT)
Dept: RADIOLOGY | Facility: CLINIC | Age: 66
End: 2021-11-09
Payer: COMMERCIAL

## 2021-11-09 ENCOUNTER — OUTPATIENT (OUTPATIENT)
Dept: OUTPATIENT SERVICES | Facility: HOSPITAL | Age: 66
LOS: 1 days | End: 2021-11-09
Payer: COMMERCIAL

## 2021-11-09 ENCOUNTER — APPOINTMENT (OUTPATIENT)
Dept: MAMMOGRAPHY | Facility: CLINIC | Age: 66
End: 2021-11-09
Payer: COMMERCIAL

## 2021-11-09 DIAGNOSIS — Z90.710 ACQUIRED ABSENCE OF BOTH CERVIX AND UTERUS: Chronic | ICD-10-CM

## 2021-11-09 DIAGNOSIS — Z00.8 ENCOUNTER FOR OTHER GENERAL EXAMINATION: ICD-10-CM

## 2021-11-09 DIAGNOSIS — Z98.89 OTHER SPECIFIED POSTPROCEDURAL STATES: Chronic | ICD-10-CM

## 2021-11-09 PROCEDURE — 76641 ULTRASOUND BREAST COMPLETE: CPT

## 2021-11-09 PROCEDURE — 76641 ULTRASOUND BREAST COMPLETE: CPT | Mod: 26,50

## 2021-11-09 PROCEDURE — 77067 SCR MAMMO BI INCL CAD: CPT | Mod: 26

## 2021-11-09 PROCEDURE — 77080 DXA BONE DENSITY AXIAL: CPT | Mod: 26

## 2021-11-09 PROCEDURE — 77063 BREAST TOMOSYNTHESIS BI: CPT

## 2021-11-09 PROCEDURE — 77063 BREAST TOMOSYNTHESIS BI: CPT | Mod: 26

## 2021-11-09 PROCEDURE — 77080 DXA BONE DENSITY AXIAL: CPT

## 2021-11-09 PROCEDURE — 77067 SCR MAMMO BI INCL CAD: CPT

## 2021-11-27 ENCOUNTER — TRANSCRIPTION ENCOUNTER (OUTPATIENT)
Age: 66
End: 2021-11-27

## 2021-12-17 ENCOUNTER — RX RENEWAL (OUTPATIENT)
Age: 66
End: 2021-12-17

## 2022-01-28 ENCOUNTER — APPOINTMENT (OUTPATIENT)
Dept: CARDIOLOGY | Facility: CLINIC | Age: 67
End: 2022-01-28
Payer: COMMERCIAL

## 2022-01-28 ENCOUNTER — NON-APPOINTMENT (OUTPATIENT)
Age: 67
End: 2022-01-28

## 2022-01-28 VITALS
DIASTOLIC BLOOD PRESSURE: 87 MMHG | HEART RATE: 55 BPM | SYSTOLIC BLOOD PRESSURE: 144 MMHG | BODY MASS INDEX: 34.71 KG/M2 | WEIGHT: 150 LBS | HEIGHT: 55 IN | OXYGEN SATURATION: 99 %

## 2022-01-28 PROCEDURE — 93000 ELECTROCARDIOGRAM COMPLETE: CPT

## 2022-01-28 PROCEDURE — 99213 OFFICE O/P EST LOW 20 MIN: CPT

## 2022-02-16 NOTE — PHYSICAL EXAM
[General Appearance - Well Developed] : well developed [Normal Appearance] : normal appearance [Well Groomed] : well groomed [General Appearance - Well Nourished] : well nourished [General Appearance - In No Acute Distress] : no acute distress [Normal Conjunctiva] : the conjunctiva exhibited no abnormalities [Eyelids - No Xanthelasma] : the eyelids demonstrated no xanthelasmas [FreeTextEntry1] : pt is wearing a mask  [Normal Jugular Venous A Waves Present] : normal jugular venous A waves present [Normal Jugular Venous V Waves Present] : normal jugular venous V waves present [No Jugular Venous Basilio A Waves] : no jugular venous basilio A waves [Respiration, Rhythm And Depth] : normal respiratory rhythm and effort [Exaggerated Use Of Accessory Muscles For Inspiration] : no accessory muscle use [Auscultation Breath Sounds / Voice Sounds] : lungs were clear to auscultation bilaterally [Heart Rate And Rhythm] : heart rate and rhythm were normal [Heart Sounds] : normal S1 and S2 [Murmurs] : no murmurs present [Arterial Pulses Normal] : the arterial pulses were normal [Edema] : no peripheral edema present [Bowel Sounds] : normal bowel sounds [Abdomen Soft] : soft [Abdomen Tenderness] : non-tender [Abnormal Walk] : normal gait [Gait - Sufficient For Exercise Testing] : the gait was sufficient for exercise testing [Nail Clubbing] : no clubbing of the fingernails [Cyanosis, Localized] : no localized cyanosis [Petechial Hemorrhages (___cm)] : no petechial hemorrhages [Skin Color & Pigmentation] : normal skin color and pigmentation [Skin Turgor] : normal skin turgor [] : no rash [No Venous Stasis] : no venous stasis [Oriented To Time, Place, And Person] : oriented to person, place, and time [Impaired Insight] : insight and judgment were intact [Affect] : the affect was normal [Mood] : the mood was normal [No Anxiety] : not feeling anxious

## 2022-02-16 NOTE — DISCUSSION/SUMMARY
[FreeTextEntry1] : This is a 65 yo woman with pre-DM (HbA1C 6.0 2020 ), HLD on statin, stable CAD s/p NSTEMI with LAD STEPHEN placement 12/2019. Estefania is doing well from the cardiac stand point.  \par \par CAD s/p NSTEMI with mild LV dysfunction\par -Continue ASA.  \par -Continue  ACEi.  Previously BB was discontinued due to bradycardia.  \par \par HLD LDL 82\par -Continue statin.  Lipitor decreased to 40 mg po daily today.  \par \par HTN above goal.  \par -For now cont ACEi and she will do BP monitor and if it remains elevated she will notify me to adjust meds.\par \par Follow with me in 4 months.  Discussed lifestyle changes, risk factor modifications, regular exercise, etc given her cardiac event.

## 2022-02-16 NOTE — HISTORY OF PRESENT ILLNESS
[FreeTextEntry1] : This is a 65 yo woman with pre-DM (HbA1C 6.0 2020), HLD on statin, stable CAD s/p NSTEMI  with LAD STEPHEN 12/2019.  Pt presented with NSTEMI back in December, 2019.  She subsequently underwent cardiac cath which showed LAD disease and STEPHEN was placed.  She had mild LV dysfunction on TTE.  Pt reports compliance with meds.  She denies any CP, SOB, WAN, LE edema, PND, orthopnea.

## 2022-05-06 ENCOUNTER — APPOINTMENT (OUTPATIENT)
Dept: CARDIOLOGY | Facility: CLINIC | Age: 67
End: 2022-05-06

## 2022-05-07 ENCOUNTER — NON-APPOINTMENT (OUTPATIENT)
Age: 67
End: 2022-05-07

## 2022-05-07 DIAGNOSIS — U07.1 COVID-19: ICD-10-CM

## 2022-05-07 RX ORDER — NIRMATRELVIR AND RITONAVIR 300-100 MG
20 X 150 MG & KIT ORAL
Qty: 1 | Refills: 0 | Status: ACTIVE | COMMUNITY
Start: 2022-05-07 | End: 1900-01-01

## 2022-05-25 ENCOUNTER — NON-APPOINTMENT (OUTPATIENT)
Age: 67
End: 2022-05-25

## 2022-06-09 ENCOUNTER — RX RENEWAL (OUTPATIENT)
Age: 67
End: 2022-06-09

## 2022-08-22 ENCOUNTER — RX RENEWAL (OUTPATIENT)
Age: 67
End: 2022-08-22

## 2022-08-24 ENCOUNTER — RX RENEWAL (OUTPATIENT)
Age: 67
End: 2022-08-24

## 2022-11-02 NOTE — ED PROVIDER NOTE - CONTACT TIME
Plan:  Patient is asked to apply a nightly moisturizer to his legs.  He should wear some form of compression garment during waking hours.  I am pleased that he is sleeping in bed.  When he is sitting out of bed he should elevate his legs as much as possible.  He has limited ambulatory potential because of his cardiac and COPD issues.  I see no evidence of peripheral arterial disease.  I do not think he is a reasonable candidate for vein ablation.    The patient would be offered Unna boot compression therapy if he were to develop wounds or severe stasis dermatitis in the future.   07-Dec-2019 16:41

## 2022-11-08 ENCOUNTER — APPOINTMENT (OUTPATIENT)
Dept: ENDOCRINOLOGY | Facility: CLINIC | Age: 67
End: 2022-11-08

## 2022-11-08 ENCOUNTER — RX RENEWAL (OUTPATIENT)
Age: 67
End: 2022-11-08

## 2022-11-08 ENCOUNTER — APPOINTMENT (OUTPATIENT)
Dept: ORTHOPEDIC SURGERY | Facility: CLINIC | Age: 67
End: 2022-11-08

## 2022-11-08 VITALS
OXYGEN SATURATION: 98 % | HEART RATE: 70 BPM | SYSTOLIC BLOOD PRESSURE: 122 MMHG | WEIGHT: 160 LBS | DIASTOLIC BLOOD PRESSURE: 80 MMHG | BODY MASS INDEX: 29.44 KG/M2 | HEIGHT: 62 IN

## 2022-11-08 VITALS
HEART RATE: 60 BPM | DIASTOLIC BLOOD PRESSURE: 98 MMHG | HEIGHT: 62 IN | SYSTOLIC BLOOD PRESSURE: 179 MMHG | WEIGHT: 152 LBS | BODY MASS INDEX: 27.97 KG/M2

## 2022-11-08 LAB — GLUCOSE BLDC GLUCOMTR-MCNC: 127

## 2022-11-08 PROCEDURE — 73502 X-RAY EXAM HIP UNI 2-3 VIEWS: CPT | Mod: RT

## 2022-11-08 PROCEDURE — 99204 OFFICE O/P NEW MOD 45 MIN: CPT

## 2022-11-08 PROCEDURE — 95250 CONT GLUC MNTR PHYS/QHP EQP: CPT

## 2022-11-08 PROCEDURE — 82962 GLUCOSE BLOOD TEST: CPT

## 2022-11-08 PROCEDURE — 99205 OFFICE O/P NEW HI 60 MIN: CPT | Mod: 25

## 2022-11-08 RX ORDER — MIRTAZAPINE 15 MG/1
15 TABLET, FILM COATED ORAL
Qty: 30 | Refills: 0 | Status: ACTIVE | COMMUNITY
Start: 2022-07-12

## 2022-11-08 RX ORDER — ATORVASTATIN CALCIUM 80 MG/1
TABLET, FILM COATED ORAL
Refills: 0 | Status: ACTIVE | COMMUNITY

## 2022-11-08 NOTE — ASSESSMENT
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 Patient reports recurrent episodes of Hypoglycemia 40s - 60s at different times of the day, fasting and postprandial. She reports symptoms of hypoglycemia which resolve after eating something sweet (sugar tablets). She was seen by endocrinologist in 2020 with fasting Hypoglycemia panel - all normal but labs were done while fasting glucose was 109. Patient has remote Hx (2013) for pancreatic mass (reported as benign fatty lesion). No images or reports available. She also has Hx of hiatal hernia repair with episodes of recurrent diarrhea.  \par \par Currently on:\par Lisinopril 10 mg daily \par ASA 81 mg daily\par Lipitor 80 mg - takes once a week\par Mirtazapine - prn, recently prescribed for ADHD\par Multivitamins - 2 types\par \par # Recurrent Hypoglycemic episodes - fasting and post prandial\par Patient brings her glucometer with recorded multiple Hypoglycemic episodes\par Reportedly fulfilling Whipples triad.\par Outpatient Hypoglycemia work up - previously negative, but at the time of testing she was not hypoglycemic.\par Will apply Szl: LOT [id number], SN [id number], Exp 12/31/22\par Patient instructed to record symptoms of hypoglycemia and to describe pre or post prandial and what takes to alleviate symptoms.\par Will call the patient in 2 weeks after LIbrePro download available. Further management to be discussed. \par \par # Hx of benign/fatty pancreatic mass.\par Patient will bring records and images from previous place.\par She will be repeating MRI soon. We will follow reports. \par \par # Hx of Thyroid nodules\par Reportedly s/p FNA - benign\par Will send for repeat Thyroid US\par Patient is clinically and biochemically euthyroid.\par \par # Pre-diabetes\par A1C 6.4% (10/20/22 - scanned labs)\par Discussed dietary and lifestyle modification\par At this moment would not recommend strict carbconsistent diet\par Advised regular exercise 30 min 3 x week. \par

## 2022-11-08 NOTE — PHYSICAL EXAM
[Alert] : alert [No Acute Distress] : no acute distress [Normal Voice/Communication] : normal voice communication [EOMI] : extra ocular movement intact [PERRL] : pupils equal, round and reactive to light [Normal Outer Ear/Nose] : the ears and nose were normal in appearance [Normal Hearing] : hearing was normal [Thyroid Not Enlarged] : the thyroid was not enlarged [No Respiratory Distress] : no respiratory distress [Clear to Auscultation] : lungs were clear to auscultation bilaterally [Normal Rate] : heart rate was normal [Regular Rhythm] : with a regular rhythm [No Edema] : no peripheral edema [Pedal Pulses Normal] : the pedal pulses are present [Normal Bowel Sounds] : normal bowel sounds [Not Tender] : non-tender [Soft] : abdomen soft [Normal Supraclavicular Nodes] : no supraclavicular lymphadenopathy [Normal Anterior Cervical Nodes] : no anterior cervical lymphadenopathy [Normal Posterior Cervical Nodes] : no posterior cervical lymphadenopathy [No Stigmata of Cushings Syndrome] : no stigmata of Cushings Syndrome [Normal Gait] : normal gait [No Clubbing, Cyanosis] : no clubbing  or cyanosis of the fingernails [No Joint Swelling] : no joint swelling seen [No Rash] : no rash [No Skin Lesions] : no skin lesions [Normal Reflexes] : deep tendon reflexes were 2+ and symmetric [No Tremors] : no tremors [Oriented x3] : oriented to person, place, and time [Normal Affect] : the affect was normal [Normal Insight/Judgement] : insight and judgment were intact [Normal Mood] : the mood was normal [Acanthosis Nigricans] : no acanthosis nigricans [de-identified] : There is right mid pole thyroid nodule

## 2022-11-08 NOTE — HISTORY OF PRESENT ILLNESS
[Worsening] : worsening [Daily] : ~He/She~ states the symptoms seem to be occuring daily [Walking] : worsened by walking [Acetaminophen] : relieved by acetaminophen [NSAIDs] : relieved by nonsteroidal anti-inflammatory drugs [Rest] : relieved by rest [de-identified] : 67-year-old female with past medical history of hypertension, hyperlipidemia, MI (2019) s/p cardiac stents on ASA 81mg daily presents to office for initial evaluation of right hip pain.  The patient states she has had pain in her right hip and groin that radiates down her right leg for the past 3 months.  She states that the pain progressively worsens as the day goes on to the point where it is difficult to even walk at the end of the day.  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  She states that she does trip over her feet a lot and recalls her last fall around July 4 but does not recall injuring her hip at that time.  Admits to some lower back pain on the right side as well.  Admits to numbness and tingling in the right foot.  Takes Tylenol Motrin and occasionally muscle relaxer with good relief of pain.  Has not been to physical therapy and denies any injections or surgeries in the right hip.  Denies any mechanical blocks of the right hip, swelling of the right lower extremity.  Of note she states a few years ago she had total weakness on the left side of her body and she was being ruled out for MS as it runs in her family, she is not currently taking any medications for it.\par \par Denies a history of smoking alcohol or drug use. [Direct Pressure] : not worsened by direct pressure

## 2022-11-08 NOTE — HISTORY OF PRESENT ILLNESS
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 Patient reports recurrent episodes of Hypoglycemia 40s - 60s at different times of the day, fasting and postprandial. She reports symptoms of hypoglycemia which resolve after eating something sweet (sugar tablets). She was seen by endocrinologist in 2020 with fasting Hypoglycemia panel - all normal but labs were done while fasting glucose was 109. Patient has remote Hx (2013) for pancreatic mass (reported as benign fatty lesion). No images or reports available. She also has Hx of hiatal hernia repair with episodes of recurrent diarrhea.

## 2022-11-08 NOTE — PHYSICAL EXAM
[de-identified] : GENERAL APPEARANCE: Well nourished and hydrated, pleasant, alert, and oriented x 3. Appears their stated age. \par HEENT: Normocephalic, extraocular eye motion intact. Nasal septum midline. Oral cavity clear. External auditory canal clear. \par RESPIRATORY: Breath sounds clear and audible in all lobes. No wheezing, No accessory muscle use.\par CARDIOVASCULAR: No apparent abnormalities. No lower leg edema. No varicosities. Pedal pulses are palpable.\par NEUROLOGIC: Sensation is normal, no muscle weakness in the upper or lower extremities.\par DERMATOLOGIC: No apparent skin lesions, moist, warm, no rash.\par SPINE: Cervical spine appears normal and moves freely; thoracic spine appears normal and moves freely; there is tenderness palpation over the right paraspinal musculature\par MUSCULOSKELETAL: Hands, wrists, and elbows are normal and move freely, shoulders are normal and move freely. \par Psychiatric: Oriented to person, place, and time, insight and judgement were intact and the affect was normal. \par Musculoskeletal: ambulates normal. Right hip exam showed mild groin pain with SLR, ROM is full flexion with approximately 50 degrees of external rotation and 20 degrees of internal rotation, KENNEY positive, FADIR negative\par 5/5 motor strength in bilateral lower extremities. Sensory: Intact in bilateral lower extremities. DTRs: Biceps, brachioradialis, triceps, patellar, ankle and plantar 2+ and symmetric bilaterally. Pulses: dorsalis pedis, posterior tibial, femoral, popliteal, and radial 2+ and symmetric bilaterally. \par  [de-identified] : AP pelvis and 2 views of the right hip obtained the office today show no acute fracture or dislocation.  Possible mild degenerative changes noted

## 2022-11-08 NOTE — DISCUSSION/SUMMARY
[Medication Risks Reviewed] : Medication risks reviewed [Surgical risks reviewed] : Surgical risks reviewed [de-identified] : Patient is a 67-year-old female with right hip pain presenting today for initial evaluation.  The pain in her hip would become so severe while at work that she needed to be brought out in a wheelchair.  She states that she was unable go to work yesterday due to severe pain in her hip.  She has no acute radiographic findings to explain the severe hip pain.  Due to the fact that she is having such severe pain and dysfunction I recommended an MRI of her right hip for further evaluation management or to rule out any occult fractures as well as occult degenerative changes.  I will see her back after the MRI for repeat evaluation management.  All questions were asked and answered.  She was advised to refrain from any high impact activity

## 2022-11-19 ENCOUNTER — APPOINTMENT (OUTPATIENT)
Dept: MRI IMAGING | Facility: CLINIC | Age: 67
End: 2022-11-19

## 2022-11-19 ENCOUNTER — OUTPATIENT (OUTPATIENT)
Dept: OUTPATIENT SERVICES | Facility: HOSPITAL | Age: 67
LOS: 1 days | End: 2022-11-19
Payer: COMMERCIAL

## 2022-11-19 ENCOUNTER — RESULT REVIEW (OUTPATIENT)
Age: 67
End: 2022-11-19

## 2022-11-19 DIAGNOSIS — M25.551 PAIN IN RIGHT HIP: ICD-10-CM

## 2022-11-19 DIAGNOSIS — Z90.710 ACQUIRED ABSENCE OF BOTH CERVIX AND UTERUS: Chronic | ICD-10-CM

## 2022-11-19 DIAGNOSIS — Z00.8 ENCOUNTER FOR OTHER GENERAL EXAMINATION: ICD-10-CM

## 2022-11-19 DIAGNOSIS — Z98.89 OTHER SPECIFIED POSTPROCEDURAL STATES: Chronic | ICD-10-CM

## 2022-11-19 PROCEDURE — 73721 MRI JNT OF LWR EXTRE W/O DYE: CPT

## 2022-11-19 PROCEDURE — 73721 MRI JNT OF LWR EXTRE W/O DYE: CPT | Mod: 26,RT

## 2022-11-27 ENCOUNTER — NON-APPOINTMENT (OUTPATIENT)
Age: 67
End: 2022-11-27

## 2022-11-28 ENCOUNTER — APPOINTMENT (OUTPATIENT)
Dept: ORTHOPEDIC SURGERY | Facility: CLINIC | Age: 67
End: 2022-11-28

## 2022-11-28 PROCEDURE — 99441: CPT

## 2022-11-30 ENCOUNTER — NON-APPOINTMENT (OUTPATIENT)
Age: 67
End: 2022-11-30

## 2022-12-08 ENCOUNTER — APPOINTMENT (OUTPATIENT)
Dept: MAMMOGRAPHY | Facility: CLINIC | Age: 67
End: 2022-12-08

## 2022-12-08 ENCOUNTER — APPOINTMENT (OUTPATIENT)
Dept: ULTRASOUND IMAGING | Facility: CLINIC | Age: 67
End: 2022-12-08

## 2022-12-08 ENCOUNTER — OUTPATIENT (OUTPATIENT)
Dept: OUTPATIENT SERVICES | Facility: HOSPITAL | Age: 67
LOS: 1 days | End: 2022-12-08
Payer: COMMERCIAL

## 2022-12-08 DIAGNOSIS — Z98.89 OTHER SPECIFIED POSTPROCEDURAL STATES: Chronic | ICD-10-CM

## 2022-12-08 DIAGNOSIS — Z00.8 ENCOUNTER FOR OTHER GENERAL EXAMINATION: ICD-10-CM

## 2022-12-08 DIAGNOSIS — E04.1 NONTOXIC SINGLE THYROID NODULE: ICD-10-CM

## 2022-12-08 DIAGNOSIS — Z90.710 ACQUIRED ABSENCE OF BOTH CERVIX AND UTERUS: Chronic | ICD-10-CM

## 2022-12-08 PROCEDURE — 77063 BREAST TOMOSYNTHESIS BI: CPT | Mod: 26

## 2022-12-08 PROCEDURE — 77067 SCR MAMMO BI INCL CAD: CPT | Mod: 26

## 2022-12-08 PROCEDURE — 76641 ULTRASOUND BREAST COMPLETE: CPT

## 2022-12-08 PROCEDURE — 76536 US EXAM OF HEAD AND NECK: CPT | Mod: 26

## 2022-12-08 PROCEDURE — 77067 SCR MAMMO BI INCL CAD: CPT

## 2022-12-08 PROCEDURE — 76641 ULTRASOUND BREAST COMPLETE: CPT | Mod: 26,50

## 2022-12-08 PROCEDURE — 76536 US EXAM OF HEAD AND NECK: CPT

## 2022-12-08 PROCEDURE — 77063 BREAST TOMOSYNTHESIS BI: CPT

## 2022-12-13 ENCOUNTER — APPOINTMENT (OUTPATIENT)
Dept: PHYSICAL MEDICINE AND REHAB | Facility: CLINIC | Age: 67
End: 2022-12-13

## 2022-12-13 DIAGNOSIS — M51.26 OTHER INTERVERTEBRAL DISC DISPLACEMENT, LUMBAR REGION: ICD-10-CM

## 2022-12-13 PROCEDURE — 99204 OFFICE O/P NEW MOD 45 MIN: CPT

## 2022-12-13 RX ORDER — GABAPENTIN 100 MG/1
100 CAPSULE ORAL
Qty: 90 | Refills: 1 | Status: ACTIVE | COMMUNITY
Start: 2022-12-13 | End: 1900-01-01

## 2022-12-13 NOTE — HISTORY OF PRESENT ILLNESS
[FreeTextEntry1] : Ms. GRACIELA CABRERA is a 67 year old female with a PMHx of CAD s/p stents, HTN, ?M.S. who presents with low back pain. 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 She was sent home and has not worked since. \par Provocation/Palliative:Worse with lifting and activity, better with rest\par Quality:Achy\par Radiation:Down RLE down to R anterior shin\par Severity:Moderate to severe\par Timing:Not improving with time\par \par Has intermittent numbness down RLE. Denies any associated leg weakness. Denies any loss of bowel/bladder control or any groin numbness.\par Previous medications trialed:Motrin with some relief\par Previous procedures relevant to complaint:None\par Conservative therapy tried?:\par

## 2022-12-13 NOTE — ASSESSMENT
[FreeTextEntry1] : Ms. GRACIELA CABRERA is a 67 year old female who presents with right sided low back/hip pain, possibly due to a radiculopathy from disc herniation. Denies any red flag signs. Will recommend:\par - previous MRI L Spine reviewed\par - Will obtain new MRI L Spine given radicular features\par - Will start trial of Gabapentin 100mg Qhs with gradual increase to 300mg Qhs. Patient aware of side effects and risks including sedation, leg swelling, and possible mood changes. \par - Start PT 2-3x/week for stretching, strengthening (especially of core muscles), ROM exercises, HEP and modalities PRN including myofascial release, moist heat\par \par \par RTC after MRI. Patient aware of red flag signs including any changes to their bowel/bladder control, groin numbness or new weakness. Patient knows to seek immediate attention by calling 911 or going to nearest ER if these symptoms appear.

## 2022-12-13 NOTE — PHYSICAL EXAM
[FreeTextEntry1] : PE:\par Constitutional: In NAD, calm and cooperative\par MSK (Back)\par 	Inspection: no gross swelling identified\par 	Palpation: Tenderness of the right lower lumbar paraspinals\par 	ROM: Pain at end lumbar extension and at 80 degrees lumbar flexion\par 	Strength: 5/5 strength in bilateral lower extremities except for R hip flexion which is 4/5 due to pain\par 	Reflexes: 2+ Patella reflex bilaterally, 2+ Achilles reflex bilaterally, negative clonus bilaterally\par 	Sensation: Intact to light touch in bilateral lower extremities\par Special tests:\par SLR:equivocal on right, negative on left\par KENNEY:negative on right\par FADIR: equivocal on right\par Facet loading: positive bilaterally

## 2022-12-13 NOTE — DATA REVIEWED
[FreeTextEntry1] : MR [facility] Spine 13 Stand up MRI reviewed by me: multilevel spondylosis and disc degeneration seen, L1 compression fx seen\par \par Thu Sep 19 04:00:00 GMT  -- Preliminary Report\par \par Patient Name : TIMMY^^^^\par Patient ID : [id number]\par Patient  : 1955\par Accession Number : [id number]\par \par \par GRACIELA CABRERA\par [id number]\par Report Date:\par 13\par \par :\par 1955\par \par \par \par Exam Date:\par 2013\par \par \par \par \par HAYLEY SINGER MD\par 6 TECHNOLOGY  [address]\par [place] [id number]\par \par [facility] IMAGING OF THE LUMBAR SPINE\par \par TECHNIQUE: Neutral Sitting: Axial T1, Axial T2, Sagittal T1 and Sagittal T2\par \par HISTORY: Patient complains of lower back pain with left leg pain and\par difficulty walking.\par \par INTERPRETATION: Examination is compared to previous MRI study of the lumbar\par spine dated 6/3/10.\par \par L1 vertebral body compression fracture is identified with approximately 30%\par loss of height. Mild retropulsion is noted deforming the anterior margin of\par the thecal sac. Bone marrow signal changes are noted within the anterior\par vertebral body suggestive of subacute etiology. Hemangioma is identified\par posteriorly within the L1 vertebral body.\par \par Scoliosis is noted at the thoracolumbar junction.\par \par At the L5/S1 disc space level, disc bulge is noted deforming the anterior\par margin of the thecal sac. Paracentral components are noted within the\par proximal neural foramina without evidence of neural foraminal stenosis. Loss\par of disc signal is noted with preservation of disc space height.\par \par At L4/5, disc herniation is noted deforming the thecal sac. Left neural\par foraminal extension is noted abutting the exiting left L4 nerve root. Right\par neural foraminal extension is noted approaching the exiting right L4 nerve\par root. Bilateral facet hypertrophic changes are noted. Loss of disc space\par height and signal is noted with endplate signal changes.\par \par At L3/4, disc herniation is noted with Grade I retrolisthesis deforming the\par thecal sac. Bilateral neural foraminal extension is noted approaching the\par exiting nerve roots with associated bilateral neural foraminal narrowing.\par Loss of disc space height and signal, endplate signal changes, Schmorl's node\par formation and anterior hypertrophic changes are noted.\par \par At L2/3, disc herniation is noted with Grade I retrolisthesis deforming the\par thecal sac. Bilateral neural foraminal extension is noted approaching the\par exiting L2 nerve roots and associated bilateral neural foraminal narrowing.\par Loss of disc space height and signal, endplate signal changes and anterior\par hypertrophic changes are noted.\par \par \par At L1/2, there is no evidence of herniated disc, spinal canal compromise,\par neural foraminal stenosis or loss of disc space height.\par \par At T12/L1, disc bulge is noted deforming the thecal sac. There is no evidence\par of neural foraminal stenosis. Loss of disc space height and signal is noted\par with anterior disc extension.\par \par There is only limited assessment of the T11/12 disc space on the sagittal\par sequences. Loss of disc signal with Schmorl's node formation is noted.\par \par There is no evidence of bone marrow infiltrative disorder. No evidence of\par spondylolisthesis. No evidence of signal abnormality within the conus\par medullaris which is located at the approximate T12/ vertebral body level.\par \par Examination is compared to previous MRI study of the lumbar spine dated\par 6/3/10. L1 vertebral body compression represents interval change compared to\par prior study. L2/3, L3/4, and L4/5 disc herniations with Grade I\par retrolisthesis at L2/3 and L3/4 appear similar when compared to prior exam.\par Previously described L5/S1 disc herniation is not currently appreciated.\par Scoliosis appears similar compared to prior study. T12/L1 disc bulge\par represents interval change compared to prior study.\par \par IMPRESSION: L1 VERTEBRAL BODY COMPRESSION FRACTURE WITH MILD RETROPULSION\par DEFORMING THE THECAL SAC, WITH BONE MARROW SIGNAL CHANGES SUGGESTIVE OF\par SUBACUTE ETIOLOGY.\par \par L2/3, L3/4 AND L4/5 DISC HERNIATIONS DEFORMING THE THECAL SAC DEMONSTRATING\par BILATERAL NEURAL FORAMINAL EXTENSION AT ALL THREE LEVELS, BILATERAL NEURAL\par FORAMINAL NARROWING AND GRADE I RETROLISTHESIS NOTED AT BOTH L2/3 AN L3/4.\par \par L5/S1 AND T12/L1 DISC BULGES.\par \par SCOLIOSIS.\par \par Thank you for referring your patient to us for evaluation.\par \par Sincerely,\par \par Manuel Thomas M.D.\par Diplomate of the American Board of Radiology\par \par HT/lf\par

## 2022-12-21 ENCOUNTER — NON-APPOINTMENT (OUTPATIENT)
Age: 67
End: 2022-12-21

## 2022-12-27 ENCOUNTER — NON-APPOINTMENT (OUTPATIENT)
Age: 67
End: 2022-12-27

## 2022-12-30 ENCOUNTER — APPOINTMENT (OUTPATIENT)
Dept: MRI IMAGING | Facility: CLINIC | Age: 67
End: 2022-12-30
Payer: COMMERCIAL

## 2022-12-30 ENCOUNTER — OUTPATIENT (OUTPATIENT)
Dept: OUTPATIENT SERVICES | Facility: HOSPITAL | Age: 67
LOS: 1 days | End: 2022-12-30

## 2022-12-30 DIAGNOSIS — Z98.89 OTHER SPECIFIED POSTPROCEDURAL STATES: Chronic | ICD-10-CM

## 2022-12-30 DIAGNOSIS — Z00.8 ENCOUNTER FOR OTHER GENERAL EXAMINATION: ICD-10-CM

## 2022-12-30 DIAGNOSIS — Z90.710 ACQUIRED ABSENCE OF BOTH CERVIX AND UTERUS: Chronic | ICD-10-CM

## 2022-12-30 PROCEDURE — 72148 MRI LUMBAR SPINE W/O DYE: CPT | Mod: 26

## 2023-01-09 ENCOUNTER — APPOINTMENT (OUTPATIENT)
Dept: PHYSICAL MEDICINE AND REHAB | Facility: CLINIC | Age: 68
End: 2023-01-09
Payer: COMMERCIAL

## 2023-01-09 VITALS
SYSTOLIC BLOOD PRESSURE: 139 MMHG | DIASTOLIC BLOOD PRESSURE: 68 MMHG | WEIGHT: 160 LBS | HEIGHT: 62 IN | BODY MASS INDEX: 29.44 KG/M2

## 2023-01-09 DIAGNOSIS — M54.16 RADICULOPATHY, LUMBAR REGION: ICD-10-CM

## 2023-01-09 PROCEDURE — 99214 OFFICE O/P EST MOD 30 MIN: CPT

## 2023-01-09 NOTE — ASSESSMENT
[FreeTextEntry1] : Ms. GRACIELA CABRERA is a 67 year old female who presents with right sided low back/hip pain, possibly due to a radiculopathy from underlying stenosis. Her underlying gluteus medius/minimus tearing and edema likely also contributes to her pain. She has gained some benefit from PT and gabapentin. Denies any red flag signs. Will recommend:\par - Patient to increase gabapentin to 300mg Qhs. Patient aware of side effects and risks including sedation, leg swelling, and possible mood changes. \par - Continue PT 2-3x/week for stretching, strengthening (especially of core muscles), ROM exercises, HEP and modalities PRN including myofascial release, moist heat\par - Patient to return to work at light duty for 2 weeks\par \par RTC in 4-5 weeks.  Patient aware of red flag signs including any changes to their bowel/bladder control, groin numbness or new weakness. Patient knows to seek immediate attention by calling 911 or going to nearest ER if these symptoms appear.

## 2023-01-09 NOTE — HISTORY OF PRESENT ILLNESS
[FreeTextEntry1] : Ms. GRACIELA CABRERA is a 67 year old  female who presents for follow up. At last visit, she was ordered a MRI L Spine, started on Gabapentin and PT. Overall she does feel a little better. She only has been taking Gabapentin 100mg Qhs. She recently started the PT. Denies any new symptoms. 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 She was sent home and has not worked since. \par Provocation/Palliative:Worse with lifting and activity, better with rest\par Quality:Achy\par Radiation:Down RLE down to R anterior shin\par Severity:Moderate to severe\par Timing:Improving mildly with time\par \par No bowel/bladder changes. No groin numbness.

## 2023-01-09 NOTE — PHYSICAL EXAM
[FreeTextEntry1] : PE:\par Constitutional: In NAD, calm and cooperative\par MSK (Back)\par 	Inspection: no gross swelling identified\par 	Palpation: Tenderness of the right lower lumbar paraspinals\par 	ROM: Pain at end lumbar extension and at 80-85 degrees lumbar flexion\par 	Strength: 5/5 strength in bilateral lower extremities except for R hip flexion which is 4+/5 due to pain\par 	Reflexes: 2+ Patella reflex bilaterally, 2+ Achilles reflex bilaterally, negative clonus bilaterally\par 	Sensation: Intact to light touch in bilateral lower extremities\par Special tests:\par SLR:equivocal on right, negative on left\par KENNEY:negative on right\par FADIR: equivocal on right\par Facet loading: positive bilaterally

## 2023-01-09 NOTE — DATA REVIEWED
[FreeTextEntry1] : \par   MR Lumbar Spine No Cont             Final\par \par No Documents Attached\par \par \par \par \par   EXAM: [id number] - MR SPINE LUMBAR  - ORDERED BY: SHAWNA PADRON\par \par \par PROCEDURE DATE:  12/30/2022\par \par \par \par INTERPRETATION:  CLINICAL INFORMATION: Persistent right lower extremity radicular pain.\par \par ADDITIONAL CLINICAL INFORMATION: Low back muscle strain S39.012A\par \par TECHNIQUE: Multiplanar, multisequence MRI was performed of the lumbar spine.\par IV Contrast: NONE\par \par PRIOR STUDIES: No priors available for comparison.\par \par FINDINGS:\par \par LOCALIZER: No additional findings.\par BONES: There is a chronic mild to moderate compression deformity of the L1 vertebral body. Scattered osseous hemangiomata are noted, most prominent within the L1 and L5 vertebral bodies. Scattered chronic Schmorl's node formation is seen. Edematous endplate changes are seen at L4/L5 and L5/S1. Fatty endplate changes are seen at L2/L3 and L3/L4.\par ALIGNMENT: There is levoscoliosis of the lumbar spine. There is reversal of the lumbar lordosis. There is trace retrolisthesis of L1 on L2, L2 on L3, and L3 on L4.\par SACROILIAC JOINTS/SACRUM: There is no sacral fracture. The SI joints are partially visualized but are intact.\par CONUS AND CAUDA EQUINA: The distal cord and conus are normal in signal. Conus terminates at T12.\par VISUALIZED INTRAPELVIC/INTRA-ABDOMINAL SOFT TISSUES: There is a right-sided renal cyst measuring approximately 1.8 x 1.6 cm. Subcentimeter left-sided renal cyst is also seen.\par PARASPINAL SOFT TISSUES: Normal.\par \par \par INDIVIDUAL LEVELS:\par \par LOWER THORACIC SPINE: No spinal canal or neuroforaminal stenosis.\par \par L1-L2: There is a mild diffuse disc bulge which is asymmetric to the right. There is posterior osseous ridging. There is bilateral facet arthrosis. Findings result in minimal right lateral recess narrowing. There is mild bilateral neural foraminal narrowing, right greater than left.\par L2-L3: There is mild diffuse disc bulge with posterior osseous ridging. There is bilateral facet arthrosis. There is mild flattening of the ventral thecal sac. There is mild bilateral neural foraminal narrowing.\par L3-L4: There is mild diffuse disc bulge with posterior osseous ridging. There is bilateral facet arthrosis. Findings result in mild flattening of the ventral thecal sac. There is bilateral neural foraminal narrowing, moderate to severe on the left and mild on the right.\par L4-L5: There is a diffuse disc bulge with posterior osseous ridging and a superimposed left foraminal disc protrusion. There is bilateral facet arthrosis. Findings result in moderate left and mild right neural foraminal narrowing. There is mild left lateral recess narrowing.\par L5-S1: There is mild diffuse disc bulge with a small central disc protrusion. There is bilateral facet arthrosis. Findings result in moderate left and mild right neural foraminal narrowing. There is no central canal narrowing.\par \par \par IMPRESSION:\par \par Multilevel lumbar spondylosis. Levoscoliosis of lumbar spine. Trace retrolisthesis of L1 on L2, L2 on L3, and L3 on L4.\par \par L3-L4: There is mild flattening of the ventral thecal sac. There is bilateral neural foraminal narrowing, moderate to severe on the left and mild on the right.\par \par L4-L5: There is moderate left and mild right neural foraminal narrowing. There is mild left lateral recess narrowing.\par \par L5-S1: There is moderate left and mild right neural foraminal narrowing.\par \par --- End of Report ---\par \par \par \par \par \par \par AVTAR HSIEH MD; Attending Radiologist\par This document has been electronically signed. Sabino  3 2023 11:44AM\par \par  \par \par  Ordered by: SHAWNA PADRON       Collected/Examined: [id number] 11:38AM       \par Verification Required       Stage: Final       \par  Performed at: [facility], [place]       Resulted: 03Jan2023 11:37AM       Last Updated: 03Jan2023 11:47AM       Accession: [id number]

## 2023-01-24 ENCOUNTER — APPOINTMENT (OUTPATIENT)
Dept: CARDIOLOGY | Facility: CLINIC | Age: 68
End: 2023-01-24

## 2023-01-31 ENCOUNTER — APPOINTMENT (OUTPATIENT)
Dept: CARDIOLOGY | Facility: CLINIC | Age: 68
End: 2023-01-31
Payer: COMMERCIAL

## 2023-01-31 VITALS
OXYGEN SATURATION: 99 % | BODY MASS INDEX: 28.89 KG/M2 | HEART RATE: 60 BPM | WEIGHT: 157 LBS | DIASTOLIC BLOOD PRESSURE: 88 MMHG | SYSTOLIC BLOOD PRESSURE: 131 MMHG | HEIGHT: 62 IN

## 2023-01-31 DIAGNOSIS — I51.9 HEART DISEASE, UNSPECIFIED: ICD-10-CM

## 2023-01-31 LAB
HBA1C MFR BLD HPLC: 6.4
LDLC SERPL DIRECT ASSAY-MCNC: 69
TSH SERPL-ACNC: 3.27

## 2023-01-31 PROCEDURE — 99213 OFFICE O/P EST LOW 20 MIN: CPT

## 2023-01-31 PROCEDURE — 93000 ELECTROCARDIOGRAM COMPLETE: CPT

## 2023-02-01 ENCOUNTER — APPOINTMENT (OUTPATIENT)
Dept: ENDOCRINOLOGY | Facility: CLINIC | Age: 68
End: 2023-02-01
Payer: COMMERCIAL

## 2023-02-01 VITALS
HEIGHT: 62 IN | SYSTOLIC BLOOD PRESSURE: 112 MMHG | WEIGHT: 162 LBS | BODY MASS INDEX: 29.81 KG/M2 | DIASTOLIC BLOOD PRESSURE: 70 MMHG | OXYGEN SATURATION: 99 % | HEART RATE: 61 BPM

## 2023-02-01 DIAGNOSIS — E16.2 HYPOGLYCEMIA, UNSPECIFIED: ICD-10-CM

## 2023-02-01 DIAGNOSIS — R73.03 PREDIABETES.: ICD-10-CM

## 2023-02-01 DIAGNOSIS — E04.2 NONTOXIC MULTINODULAR GOITER: ICD-10-CM

## 2023-02-01 DIAGNOSIS — Z86.39 PERSONAL HISTORY OF OTHER ENDOCRINE, NUTRITIONAL AND METABOLIC DISEASE: ICD-10-CM

## 2023-02-01 LAB — GLUCOSE BLDC GLUCOMTR-MCNC: 120

## 2023-02-01 PROCEDURE — 99215 OFFICE O/P EST HI 40 MIN: CPT | Mod: 25

## 2023-02-01 PROCEDURE — 82962 GLUCOSE BLOOD TEST: CPT

## 2023-02-01 NOTE — ASSESSMENT
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 She has previous work up in 2020 with another endocrinologist that did not reveal hypoglycemia. LibrePro applied on the initial visit did not show Hypoglycemic episodes, however her glucometer was showing BG in 40s. Patient has Hx of "benign pancreatic mass" for which she was supposed to follow with GI with repeat MRI but is not done yet due to claustrophobia. \par \par Today patient presents for follow up. No other hypoglycemic episodes reported. \par She states she felt one time as she is in hypoglycemia but she did not check.  \par \par # Reportedly Hypoglycemic episodes \par No official Whipples triad fulfilled.\par Previous work up did not reveal the etiology.\par Suspect inaccurate glucometer reading.\par Advise to change glucometer.\par Also, patient has very poor and random eating habits. \par She needs to have 3 designated meals for better redistribution of the nutrients.  \par \par # Hx of benign/fatty pancreatic mass.\par Repeat MRI - pending \par Patient will bring records and images, once they are available\par \par # Thyroid nodules\par Reportedly has FNA of one or two nodules - benign. \par Thyroid US 12/8/22 reveals 2 spongiform nodules on the right lobe, stable in size 0.9 - 1.3 cm and appearance. Likely one of the FNAs was on them. Left nodules are subcentimeter. Will repeat US in 1 year. \par Patient is clinically and biochemically euthyroid.\par  \par # Pre-diabetes\par A1C 6.4% (10/20/22 - scanned labs)\par Discussed dietary and lifestyle modification\par Needs to have balanced diet without completely depleting carbohydrates. \par Advised regular exercise 30 min 3 x week. \par \par RTO in December 2023 with repeat BW and thyroid US. \par

## 2023-02-01 NOTE — PHYSICAL EXAM
[Alert] : alert [No Acute Distress] : no acute distress [Normal Voice/Communication] : normal voice communication [EOMI] : extra ocular movement intact [PERRL] : pupils equal, round and reactive to light [Normal Outer Ear/Nose] : the ears and nose were normal in appearance [Normal Hearing] : hearing was normal [Thyroid Not Enlarged] : the thyroid was not enlarged [No Respiratory Distress] : no respiratory distress [Clear to Auscultation] : lungs were clear to auscultation bilaterally [Normal Rate] : heart rate was normal [Regular Rhythm] : with a regular rhythm [No Edema] : no peripheral edema [Pedal Pulses Normal] : the pedal pulses are present [Normal Bowel Sounds] : normal bowel sounds [Not Tender] : non-tender [Soft] : abdomen soft [Normal Supraclavicular Nodes] : no supraclavicular lymphadenopathy [Normal Anterior Cervical Nodes] : no anterior cervical lymphadenopathy [No Stigmata of Cushings Syndrome] : no stigmata of Cushings Syndrome [Normal Gait] : normal gait [No Clubbing, Cyanosis] : no clubbing  or cyanosis of the fingernails [No Joint Swelling] : no joint swelling seen [No Rash] : no rash [No Skin Lesions] : no skin lesions [Normal Reflexes] : deep tendon reflexes were 2+ and symmetric [No Tremors] : no tremors [Oriented x3] : oriented to person, place, and time [Normal Affect] : the affect was normal [Normal Insight/Judgement] : insight and judgment were intact [Normal Mood] : the mood was normal [Acanthosis Nigricans] : no acanthosis nigricans [de-identified] : There is right mid pole thyroid nodule

## 2023-02-01 NOTE — HISTORY OF PRESENT ILLNESS
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 She has previous work up in 2020 with another endocrinologist that did not reveal hypoglycemia. LibrePro applied on the initial visit did not show Hypoglycemic episodes, however her glucometer was showing BG in 40s. Patient has Hx of "benign pancreatic mass" for which she was supposed to follow with GI with repeat MRI but is not done yet due to claustrophobia

## 2023-02-23 ENCOUNTER — APPOINTMENT (OUTPATIENT)
Dept: PHYSICAL MEDICINE AND REHAB | Facility: CLINIC | Age: 68
End: 2023-02-23
Payer: COMMERCIAL

## 2023-03-09 ENCOUNTER — APPOINTMENT (OUTPATIENT)
Dept: PHYSICAL MEDICINE AND REHAB | Facility: CLINIC | Age: 68
End: 2023-03-09
Payer: COMMERCIAL

## 2023-03-09 VITALS
BODY MASS INDEX: 29.81 KG/M2 | HEIGHT: 62 IN | SYSTOLIC BLOOD PRESSURE: 154 MMHG | DIASTOLIC BLOOD PRESSURE: 86 MMHG | WEIGHT: 162 LBS

## 2023-03-09 DIAGNOSIS — M47.816 SPONDYLOSIS W/OUT MYELOPATHY OR RADICULOPATHY, LUMBAR REGION: ICD-10-CM

## 2023-03-09 DIAGNOSIS — M25.551 PAIN IN RIGHT HIP: ICD-10-CM

## 2023-03-09 DIAGNOSIS — M48.061 SPINAL STENOSIS, LUMBAR REGION WITHOUT NEUROGENIC CLAUDICATION: ICD-10-CM

## 2023-03-09 PROBLEM — I51.9 LV DYSFUNCTION: Status: ACTIVE | Noted: 2023-03-09

## 2023-03-09 PROCEDURE — 99214 OFFICE O/P EST MOD 30 MIN: CPT

## 2023-03-09 NOTE — PHYSICAL EXAM
[FreeTextEntry1] : PE:\par Constitutional: In NAD, calm and cooperative\par MSK (Back)\par 	Inspection: no gross swelling identified\par 	Palpation: nontender to palpation of paraspinals\par 	ROM: Pain at end lumbar extension, no pain with flexion\par 	Strength: 5/5 strength in bilateral lower extremities\par 	Reflexes: 2+ Patella reflex bilaterally, 2+ Achilles reflex bilaterally, negative clonus bilaterally\par 	Sensation: Intact to light touch in bilateral lower extremities\par Special tests:\par SLR: negative bilaterally\par KENNEY:negative bilaterally\par FADIR: negative bilaterally\par Facet loading: negative bilaterally

## 2023-03-09 NOTE — HISTORY OF PRESENT ILLNESS
[FreeTextEntry1] : Ms. GRACIELA CABRERA is a 68 year old  female who presents for follow up. At last visit, she was increased on Gabapentin to 300mg QHS, continued on PT and was going to return to light duty.  She reports not taking the gabapentin regularly and more as PRN.  She is no longer in PT but doing a HEP.  She reports no pain during the day. At night she reports RLE stabbing pain from the knee down.  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 She was sent home at that time. \par Provocation/Palliative:Worse with lifting and activity at night, better with rest\par Quality:Achy stabbing\par Radiation:R knee down globally over the RLE\par Severity:Moderate to severe\par Timing:Improving mildly with time\par Medication: Motrin\par \par No bowel/bladder changes. No groin numbness.

## 2023-03-09 NOTE — DISCUSSION/SUMMARY
[FreeTextEntry1] : This is a 66 yo woman with pre-DM (HbA1C 6.0 2020 ), HLD on statin, stable CAD s/p NSTEMI with LAD STEPHEN placement 12/2019. Estefania is doing well from the cardiac stand point.  \par \par CAD s/p NSTEMI with mild LV dysfunction\par -Continue ASA.  \par -Continue  ACEi.  Previously BB was discontinued due to bradycardia.  \par -chech TTE for LV function po st MI and meds \par \par HLD LDL 69 10/22\par -Continue statin.  Lipitor previously decreased to 40 mg po daily.  \par \par HTN slightly above goal.  \par -For now cont ACEi and she will do BP monitor and if it remains elevated she will notify me to adjust meds.\par \par Follow with me in 4-5 months.  Discussed lifestyle changes, risk factor modifications, regular exercise, etc given her cardiac event.    [EKG obtained to assist in diagnosis and management of assessed problem(s)] : EKG obtained to assist in diagnosis and management of assessed problem(s)

## 2023-03-09 NOTE — ASSESSMENT
[FreeTextEntry1] : Ms. GRACIELA CABRERA is a 67 year old female who presents with right sided low back/hip pain, possibly due to a radiculopathy from underlying stenosis. Her underlying gluteus medius/minimus tearing and edema likely also contributes to her pain. She has gained some benefit from PT and gabapentin. Denies any red flag signs. Will recommend:\par -Recommend continuing gabapentin on a more regular basis for which she agreed.  Patient aware of side effects and risks including sedation, leg swelling, and possible mood changes. \par - Continue HEP \par - Patient to return to work full duty\par \par RTC in 4-5 weeks.  Patient aware of red flag signs including any changes to their bowel/bladder control, groin numbness or new weakness. Patient knows to seek immediate attention by calling 911 or going to nearest ER if these symptoms appear.

## 2023-04-13 ENCOUNTER — APPOINTMENT (OUTPATIENT)
Dept: PHYSICAL MEDICINE AND REHAB | Facility: CLINIC | Age: 68
End: 2023-04-13

## 2023-08-03 RX ORDER — LISINOPRIL 20 MG/1
20 TABLET ORAL DAILY
Qty: 1 | Refills: 5 | Status: ACTIVE | COMMUNITY
Start: 2019-12-30 | End: 1900-01-01

## 2023-08-07 ENCOUNTER — RX RENEWAL (OUTPATIENT)
Age: 68
End: 2023-08-07

## 2023-10-09 ENCOUNTER — NON-APPOINTMENT (OUTPATIENT)
Age: 68
End: 2023-10-09

## 2023-10-31 ENCOUNTER — NON-APPOINTMENT (OUTPATIENT)
Age: 68
End: 2023-10-31

## 2023-10-31 ENCOUNTER — APPOINTMENT (OUTPATIENT)
Dept: PHYSICAL MEDICINE AND REHAB | Facility: CLINIC | Age: 68
End: 2023-10-31
Payer: OTHER MISCELLANEOUS

## 2023-10-31 ENCOUNTER — APPOINTMENT (OUTPATIENT)
Dept: CARDIOLOGY | Facility: CLINIC | Age: 68
End: 2023-10-31
Payer: COMMERCIAL

## 2023-10-31 VITALS
HEART RATE: 57 BPM | WEIGHT: 158 LBS | BODY MASS INDEX: 29.08 KG/M2 | HEIGHT: 62 IN | SYSTOLIC BLOOD PRESSURE: 143 MMHG | OXYGEN SATURATION: 99 % | DIASTOLIC BLOOD PRESSURE: 82 MMHG

## 2023-10-31 VITALS
BODY MASS INDEX: 29.08 KG/M2 | HEIGHT: 62 IN | DIASTOLIC BLOOD PRESSURE: 88 MMHG | HEART RATE: 69 BPM | SYSTOLIC BLOOD PRESSURE: 137 MMHG | OXYGEN SATURATION: 98 % | WEIGHT: 158 LBS

## 2023-10-31 VITALS — DIASTOLIC BLOOD PRESSURE: 85 MMHG | SYSTOLIC BLOOD PRESSURE: 138 MMHG

## 2023-10-31 DIAGNOSIS — I25.10 ATHEROSCLEROTIC HEART DISEASE OF NATIVE CORONARY ARTERY W/OUT ANGINA PECTORIS: ICD-10-CM

## 2023-10-31 DIAGNOSIS — I10 ESSENTIAL (PRIMARY) HYPERTENSION: ICD-10-CM

## 2023-10-31 DIAGNOSIS — E78.5 HYPERLIPIDEMIA, UNSPECIFIED: ICD-10-CM

## 2023-10-31 PROCEDURE — 99213 OFFICE O/P EST LOW 20 MIN: CPT

## 2023-10-31 PROCEDURE — 99204 OFFICE O/P NEW MOD 45 MIN: CPT

## 2023-10-31 PROCEDURE — 93000 ELECTROCARDIOGRAM COMPLETE: CPT

## 2023-10-31 RX ORDER — ATORVASTATIN CALCIUM 40 MG/1
40 TABLET, FILM COATED ORAL
Qty: 90 | Refills: 1 | Status: ACTIVE | COMMUNITY
Start: 2019-12-30 | End: 1900-01-01

## 2023-11-01 ENCOUNTER — RESULT REVIEW (OUTPATIENT)
Age: 68
End: 2023-11-01

## 2023-11-06 PROBLEM — E78.5 HYPERLIPIDEMIA: Status: ACTIVE | Noted: 2020-01-30

## 2023-11-06 PROBLEM — I25.10 CAD (CORONARY ARTERY DISEASE): Status: ACTIVE | Noted: 2019-12-18

## 2023-11-13 ENCOUNTER — APPOINTMENT (OUTPATIENT)
Dept: PHYSICAL MEDICINE AND REHAB | Facility: CLINIC | Age: 68
End: 2023-11-13
Payer: OTHER MISCELLANEOUS

## 2023-11-13 VITALS
BODY MASS INDEX: 28.34 KG/M2 | DIASTOLIC BLOOD PRESSURE: 82 MMHG | RESPIRATION RATE: 15 BRPM | WEIGHT: 154 LBS | SYSTOLIC BLOOD PRESSURE: 144 MMHG | HEART RATE: 77 BPM | HEIGHT: 62 IN

## 2023-11-13 DIAGNOSIS — M79.18 MYALGIA, OTHER SITE: ICD-10-CM

## 2023-11-13 DIAGNOSIS — M54.6 PAIN IN THORACIC SPINE: ICD-10-CM

## 2023-11-13 PROCEDURE — 99213 OFFICE O/P EST LOW 20 MIN: CPT

## 2023-11-16 ENCOUNTER — OUTPATIENT (OUTPATIENT)
Dept: OUTPATIENT SERVICES | Facility: HOSPITAL | Age: 68
LOS: 1 days | End: 2023-11-16

## 2023-11-16 DIAGNOSIS — Z00.8 ENCOUNTER FOR OTHER GENERAL EXAMINATION: ICD-10-CM

## 2023-11-16 DIAGNOSIS — Z90.710 ACQUIRED ABSENCE OF BOTH CERVIX AND UTERUS: Chronic | ICD-10-CM

## 2023-11-16 DIAGNOSIS — Z98.89 OTHER SPECIFIED POSTPROCEDURAL STATES: Chronic | ICD-10-CM

## 2023-11-28 ENCOUNTER — APPOINTMENT (OUTPATIENT)
Dept: RADIOLOGY | Facility: CLINIC | Age: 68
End: 2023-11-28
Payer: COMMERCIAL

## 2023-11-28 ENCOUNTER — APPOINTMENT (OUTPATIENT)
Dept: MRI IMAGING | Facility: CLINIC | Age: 68
End: 2023-11-28
Payer: COMMERCIAL

## 2023-11-28 ENCOUNTER — RESULT REVIEW (OUTPATIENT)
Age: 68
End: 2023-11-28

## 2023-11-28 ENCOUNTER — OUTPATIENT (OUTPATIENT)
Dept: OUTPATIENT SERVICES | Facility: HOSPITAL | Age: 68
LOS: 1 days | End: 2023-11-28
Payer: SELF-PAY

## 2023-11-28 ENCOUNTER — OUTPATIENT (OUTPATIENT)
Dept: OUTPATIENT SERVICES | Facility: HOSPITAL | Age: 68
LOS: 1 days | End: 2023-11-28
Payer: COMMERCIAL

## 2023-11-28 DIAGNOSIS — Z90.710 ACQUIRED ABSENCE OF BOTH CERVIX AND UTERUS: Chronic | ICD-10-CM

## 2023-11-28 DIAGNOSIS — Z98.89 OTHER SPECIFIED POSTPROCEDURAL STATES: Chronic | ICD-10-CM

## 2023-11-28 DIAGNOSIS — Z00.8 ENCOUNTER FOR OTHER GENERAL EXAMINATION: ICD-10-CM

## 2023-11-28 PROCEDURE — 74183 MRI ABD W/O CNTR FLWD CNTR: CPT | Mod: 26

## 2023-11-28 PROCEDURE — 71045 X-RAY EXAM CHEST 1 VIEW: CPT | Mod: 26

## 2023-11-28 PROCEDURE — 74183 MRI ABD W/O CNTR FLWD CNTR: CPT

## 2023-11-28 PROCEDURE — 71045 X-RAY EXAM CHEST 1 VIEW: CPT

## 2023-11-28 PROCEDURE — 74018 RADEX ABDOMEN 1 VIEW: CPT | Mod: 26

## 2023-11-28 PROCEDURE — A9585: CPT

## 2023-11-28 PROCEDURE — 74018 RADEX ABDOMEN 1 VIEW: CPT

## 2023-12-19 ENCOUNTER — APPOINTMENT (OUTPATIENT)
Dept: ENDOCRINOLOGY | Facility: CLINIC | Age: 68
End: 2023-12-19

## 2024-01-18 ENCOUNTER — RX RENEWAL (OUTPATIENT)
Age: 69
End: 2024-01-18

## 2024-01-18 RX ORDER — ATORVASTATIN CALCIUM 80 MG/1
80 TABLET, FILM COATED ORAL
Qty: 90 | Refills: 3 | Status: ACTIVE | COMMUNITY
Start: 2024-01-18 | End: 1900-01-01

## 2024-01-22 ENCOUNTER — APPOINTMENT (OUTPATIENT)
Dept: ORTHOPEDIC SURGERY | Facility: CLINIC | Age: 69
End: 2024-01-22
Payer: COMMERCIAL

## 2024-01-22 VITALS
DIASTOLIC BLOOD PRESSURE: 72 MMHG | BODY MASS INDEX: 28.34 KG/M2 | SYSTOLIC BLOOD PRESSURE: 154 MMHG | WEIGHT: 154 LBS | HEART RATE: 63 BPM | HEIGHT: 62 IN

## 2024-01-22 PROCEDURE — 20610 DRAIN/INJ JOINT/BURSA W/O US: CPT | Mod: LT

## 2024-01-22 PROCEDURE — 73560 X-RAY EXAM OF KNEE 1 OR 2: CPT | Mod: LT

## 2024-01-22 PROCEDURE — 99203 OFFICE O/P NEW LOW 30 MIN: CPT | Mod: 25

## 2024-01-24 NOTE — PROCEDURE
[de-identified] :   Indication: Osteoarthritis of left knee   Consent: At this time, I have recommended an injection to the left knee.  The risks and benefits of the procedure were discussed with the patient in detail.  Upon verbal consent of the patient, we proceeded with the injection as noted below.    Description of Procedure:  After a sterile prep, the patient underwent an injection of approximately 9 mL of 1% Lidocaine 10 mg/mL without epinephrine and 1 mL of triamcinolone acetonide (40 mg/mL) into the left knee.  The patient tolerated the procedure well.  There were no complications.   : [employer] Drug Name: Triamcinolone Acetonide Injectable Suspension USP NDC#: [address]-0 Lot#:   [id number] Expiration Date: 08/31/2025

## 2024-01-24 NOTE — PHYSICAL EXAM
[de-identified] : Right Knee: Range of Motion in Degrees                      Claimant:    Normal:  Flexion Active     135      135-degrees  Flexion Passive     135     135-degrees  Extension Active     0-5     0-5-degrees  Extension Passive     0-5     0-5-degrees    No weakness to flexion/extension.  No evidence of instability in the AP plane or varus or valgus stress.  Negative Lachman.  Negative pivot shift.  Negative anterior drawer test.  Negative posterior drawer test.  Negative Bang.  Negative Apley grind.  No medial or lateral joint line tenderness.  No tenderness over the medial and lateral facet of the patella.  No patellofemoral crepitations.  No lateral tilting patella.  No patellar apprehension.  No crepitation in the medial and lateral femoral condyle.  No proximal or distal swelling, edema or tenderness.  No gross motor or sensory deficits.  No intra-articular swelling.  2+ DP and PT pulses. No varus or valgus malalignment.  Skin is intact.  No rashes, scars or lesions.    Left Knee: Range of Motion in Degrees                              Claimant:                 Normal:  Flexion Active         120                  135-degrees  Flexion Passive      120                  135-degrees  Extension Active      0                     0-5-degrees  Extension Passive   0                     0-5-degrees    No weakness to flexion/extension. No evidence of instability in the AP plane or varus or valgus stress.  Negative Lachman.  Negative pivot shift.  Negative anterior drawer test.  Negative posterior drawer test.  Negative Bang.  Negative Apley grind.  No medial or lateral joint line tenderness.  Positive tenderness over the medial and lateral facet of the patella.  Positive patellofemoral crepitations.  No lateral tilting patella.  No patella apprehension.  Positive crepitation in the medial and lateral femoral condyle.  No proximal or distal swelling, edema or tenderness.  No gross motor or sensory deficits. Mild intra-articular swelling.  2+ DP and PT pulses. No varus or valgus malalignment.  Skin is intact.  No rashes, scars or lesions.   [de-identified] : Gait and Station:  Ambulating with a slightly antalgic to antalgic gait.  Normal Station.  [de-identified] : Appearance:  Well-developed, well-nourished female in no acute distress.   [de-identified] : Radiographs, two views of the left knee taken in the office today, show moderate-to-early severe degenerative changes.

## 2024-01-24 NOTE — ADDENDUM
[FreeTextEntry1] : This note was written by Abdirizak Tavarez on 01/24/2024, acting as a scribe for Salvador Briggs III, MD

## 2024-01-24 NOTE — DISCUSSION/SUMMARY
[de-identified] : At this time, I recommended ice and elevation for the left knee osteoarthritis.  She will be reassessed in 3-4 weeks.

## 2024-01-24 NOTE — HISTORY OF PRESENT ILLNESS
[de-identified] : The patient comes in today for her left knee.  She had the onset of pain to her left knee and she has some pain behind her knee.  She states it is a little bit worse since she has been working.  The patient states the onset/injury occurred on 01/20/24.  This injury is not work related or due to an automobile accident.  The patient states the pain is constant.  The patient describes the pain as sharp and shooting.  [de-identified] : Walking [de-identified] : Sitting or lying down

## 2024-01-29 ENCOUNTER — APPOINTMENT (OUTPATIENT)
Dept: ORTHOPEDIC SURGERY | Facility: CLINIC | Age: 69
End: 2024-01-29
Payer: COMMERCIAL

## 2024-01-29 VITALS
BODY MASS INDEX: 35.7 KG/M2 | HEART RATE: 68 BPM | HEIGHT: 62 IN | DIASTOLIC BLOOD PRESSURE: 82 MMHG | SYSTOLIC BLOOD PRESSURE: 174 MMHG | WEIGHT: 194 LBS

## 2024-01-29 DIAGNOSIS — M25.562 PAIN IN LEFT KNEE: ICD-10-CM

## 2024-01-29 PROCEDURE — 99213 OFFICE O/P EST LOW 20 MIN: CPT

## 2024-01-30 ENCOUNTER — APPOINTMENT (OUTPATIENT)
Dept: MRI IMAGING | Facility: CLINIC | Age: 69
End: 2024-01-30
Payer: COMMERCIAL

## 2024-01-30 ENCOUNTER — OUTPATIENT (OUTPATIENT)
Dept: OUTPATIENT SERVICES | Facility: HOSPITAL | Age: 69
LOS: 1 days | End: 2024-01-30
Payer: COMMERCIAL

## 2024-01-30 DIAGNOSIS — M25.562 PAIN IN LEFT KNEE: ICD-10-CM

## 2024-01-30 DIAGNOSIS — Z00.8 ENCOUNTER FOR OTHER GENERAL EXAMINATION: ICD-10-CM

## 2024-01-30 DIAGNOSIS — Z98.89 OTHER SPECIFIED POSTPROCEDURAL STATES: Chronic | ICD-10-CM

## 2024-01-30 DIAGNOSIS — Z90.710 ACQUIRED ABSENCE OF BOTH CERVIX AND UTERUS: Chronic | ICD-10-CM

## 2024-01-30 PROCEDURE — 73721 MRI JNT OF LWR EXTRE W/O DYE: CPT | Mod: 26,LT

## 2024-01-30 PROCEDURE — 73721 MRI JNT OF LWR EXTRE W/O DYE: CPT

## 2024-01-30 NOTE — PHYSICAL EXAM
[de-identified] : Left Knee: Range of Motion in Degrees    Claimant:   Normal: Flexion Active  120   135-degrees Flexion Passive 120   135-degrees Extension Active 0   0-5-degrees Extension Passive 0   0-5-degrees  Diffusely tender along the medial femoral condyle, in addition to the joint line, as well as the tibial plateau.  No weakness to flexion/extension. No evidence of instability in the AP plane or varus or valgus stress. Negative Lachman. Negative pivot shift. Negative anterior drawer test. Negative posterior drawer test. Negative Bang. Negative Apley grind.  Positive tenderness over the medial and lateral facet of the patella. Positive patellofemoral crepitations. No lateral tilting patella. No patella apprehension. Positive crepitation in the medial and lateral femoral condyle.  No gross motor or sensory deficits. Mild intra-articular swelling. 2+ DP and PT pulses. No varus or valgus malalignment. Skin is intact. No rashes, scars or lesions.    [de-identified] : Gait and Station:  Ambulating with a slightly antalgic to antalgic gait.  Normal Station.  [de-identified] : Appearance:  Well-developed, well-nourished female in no acute distress.   [de-identified] : Review of radiographs of the left knee showed no obvious osseous abnormalities.

## 2024-01-30 NOTE — HISTORY OF PRESENT ILLNESS
[de-identified] : The patient comes in today with persistent complaints that are getting somewhat worse to the left knee.

## 2024-01-30 NOTE — ADDENDUM
[FreeTextEntry1] : This note was written by Abdirizak Tavarez on 01/30/2024, acting as a scribe for Salvador Briggs III, MD

## 2024-01-30 NOTE — DISCUSSION/SUMMARY
[de-identified] : At this time, due to left knee osteoarthritis and the severity of complaints, I recommended collateral hinged bracing and an MRI to rule out occult fracture. She will be reassessed after the MRI.  The patient was prescribed a rigid support Playmaker knee brace with range of motion joints.  The brace will safely protect the patient and help to facilitate healing by stabilizing and controlling the knee.  In order to prevent skin breakdown along bony prominences and to avoid compression of the peroneal nerve, a custom fit is necessary.

## 2024-02-05 ENCOUNTER — APPOINTMENT (OUTPATIENT)
Dept: ORTHOPEDIC SURGERY | Facility: CLINIC | Age: 69
End: 2024-02-05
Payer: COMMERCIAL

## 2024-02-05 VITALS
HEIGHT: 62 IN | BODY MASS INDEX: 30.18 KG/M2 | DIASTOLIC BLOOD PRESSURE: 88 MMHG | SYSTOLIC BLOOD PRESSURE: 154 MMHG | WEIGHT: 164 LBS | HEART RATE: 61 BPM

## 2024-02-05 PROCEDURE — 99213 OFFICE O/P EST LOW 20 MIN: CPT | Mod: 57

## 2024-02-05 PROCEDURE — 27508 TREATMENT OF THIGH FRACTURE: CPT | Mod: LT

## 2024-02-05 PROCEDURE — 27530 TREAT KNEE FRACTURE: CPT | Mod: LT

## 2024-02-05 PROCEDURE — 73565 X-RAY EXAM OF KNEES: CPT

## 2024-02-06 NOTE — DISCUSSION/SUMMARY
[de-identified] : At this time, fracture of the medial femoral condyle and tibial plateau of the left knee, I recommend continued use of the brace, cane and reassessment in three weeks.  I declare responsibility and liability for caring for this fracture for the next 90 days.

## 2024-02-06 NOTE — HISTORY OF PRESENT ILLNESS
[de-identified] : The patient comes in today for review of MRI of the left knee, which is noted below.

## 2024-02-06 NOTE — ADDENDUM
[FreeTextEntry1] : This note was written by Zehra Albarran on 02/06/2024 acting as scribe for Salvador Briggs III, MD

## 2024-02-06 NOTE — PHYSICAL EXAM
[de-identified] : Left Knee:  Knee:  Range of Motion in Degrees    Claimant:   Normal: Flexion Active  120   135-degrees Flexion Passive 120   135-degrees Extension Active 0   0-5-degrees Extension Passive 0   0-5-degrees  Diffusely tender along the medial femoral condyle, in addition to the joint line, as well as the tibial plateau.  No weakness to flexion/extension. No evidence of instability in the AP plane or varus or valgus stress. Negative Lachman. Negative pivot shift. Negative anterior drawer test. Negative posterior drawer test. Negative Bang. Negative Apley grind.  Positive tenderness over the medial and lateral facet of the patella. Positive patellofemoral crepitations. No lateral tilting patella. No patella apprehension. Positive crepitation in the medial and lateral femoral condyle.  No gross motor or sensory deficits. Mild intra-articular swelling. 2+ DP and PT pulses. No varus or valgus malalignment. Skin is intact. No rashes, scars or lesions.   [de-identified] : Gait and Station:  Ambulating with a slightly antalgic to antalgic gait.  Normal Station.  [de-identified] : Appearance:  Well-developed, well-nourished female in no acute distress.   [de-identified] : Review of MRI of the left knee shows a subchondral insufficiency fracture of the medial femoral condyle and tibial plateau.  There is a question of a retear of the posterior horn of the meniscus.    Radiographs, one to two views AP standing with brace on of the left knee, show no interval change.

## 2024-02-09 ENCOUNTER — RX RENEWAL (OUTPATIENT)
Age: 69
End: 2024-02-09

## 2024-02-21 ENCOUNTER — NON-APPOINTMENT (OUTPATIENT)
Age: 69
End: 2024-02-21

## 2024-02-27 ENCOUNTER — APPOINTMENT (OUTPATIENT)
Dept: ORTHOPEDIC SURGERY | Facility: CLINIC | Age: 69
End: 2024-02-27
Payer: COMMERCIAL

## 2024-02-27 VITALS
BODY MASS INDEX: 30.18 KG/M2 | WEIGHT: 164 LBS | HEIGHT: 62 IN | SYSTOLIC BLOOD PRESSURE: 117 MMHG | HEART RATE: 66 BPM | DIASTOLIC BLOOD PRESSURE: 73 MMHG

## 2024-02-27 PROCEDURE — 99024 POSTOP FOLLOW-UP VISIT: CPT

## 2024-02-27 PROCEDURE — 73560 X-RAY EXAM OF KNEE 1 OR 2: CPT | Mod: LT

## 2024-02-28 ENCOUNTER — APPOINTMENT (OUTPATIENT)
Dept: ORTHOPEDIC SURGERY | Facility: CLINIC | Age: 69
End: 2024-02-28

## 2024-02-29 NOTE — ADDENDUM
[FreeTextEntry1] : This note was written by Roxi Thomas on 02/29/2024 acting as scribe for Codie Whittington OTR/JOSE, PA.

## 2024-02-29 NOTE — PHYSICAL EXAM
[de-identified] : Left knee: The patient still has pain over the medial femoral condyle.  Range of motion is not assessed secondary to the fracture.  No calf tenderness.  Good distal pulses.  [de-identified] : X-rays taken in the office today, one to two views of the left knee, reveal adequate alignment of the fracture.

## 2024-03-19 ENCOUNTER — APPOINTMENT (OUTPATIENT)
Dept: ORTHOPEDIC SURGERY | Facility: CLINIC | Age: 69
End: 2024-03-19
Payer: COMMERCIAL

## 2024-03-19 PROCEDURE — 73560 X-RAY EXAM OF KNEE 1 OR 2: CPT | Mod: LT

## 2024-03-19 PROCEDURE — 99024 POSTOP FOLLOW-UP VISIT: CPT

## 2024-03-20 VITALS — BODY MASS INDEX: 30.18 KG/M2 | WEIGHT: 164 LBS | HEIGHT: 62 IN

## 2024-03-20 NOTE — ADDENDUM
[FreeTextEntry1] : This note was written by Abdirizak Tavarez on 03/20/2024, acting as a scribe for YOHANA OLIVEIRA, DENISE/L, PA

## 2024-03-20 NOTE — DISCUSSION/SUMMARY
[de-identified] : At this time, since the patient is status post medial femoral condyle and tibial plateau fracture of the left knee, the patient will be kept in the brace and return back to the office in a period of 3 weeks.  She will be out of work.

## 2024-03-20 NOTE — PHYSICAL EXAM
[de-identified] : Left Knee:  She has pain in the medial femoral condyle, as well as the tibial plateau area.  She is in a brace.  Range of motion not assessed secondary to the fractures.  [de-identified] : Gait and Station:  Ambulating with a slightly antalgic to antalgic gait.  Normal Station.  [de-identified] : Appearance:  Well-developed, well-nourished female in no acute distress.   [de-identified] : Radiographs, two views of the left knee taken in the office today, reveal adequate alignment of the fractures with no new fractures or dislocations noted.

## 2024-03-20 NOTE — HISTORY OF PRESENT ILLNESS
[de-identified] : The patient comes in today status post medial femoral condyle and tibial plateau fracture of the left knee.  She states she is still having pain in the medial femoral condyle, as well as the tibial plateau area.  She is out of work at this time.

## 2024-03-26 ENCOUNTER — APPOINTMENT (OUTPATIENT)
Dept: ORTHOPEDIC SURGERY | Facility: CLINIC | Age: 69
End: 2024-03-26

## 2024-04-09 ENCOUNTER — APPOINTMENT (OUTPATIENT)
Dept: ORTHOPEDIC SURGERY | Facility: CLINIC | Age: 69
End: 2024-04-09
Payer: COMMERCIAL

## 2024-04-09 VITALS
HEIGHT: 62 IN | HEART RATE: 64 BPM | DIASTOLIC BLOOD PRESSURE: 80 MMHG | WEIGHT: 164 LBS | SYSTOLIC BLOOD PRESSURE: 125 MMHG | TEMPERATURE: 98 F | BODY MASS INDEX: 30.18 KG/M2

## 2024-04-09 PROCEDURE — 73560 X-RAY EXAM OF KNEE 1 OR 2: CPT | Mod: LT

## 2024-04-09 PROCEDURE — 99024 POSTOP FOLLOW-UP VISIT: CPT

## 2024-04-10 NOTE — DISCUSSION/SUMMARY
[de-identified] : At this time, due to the medial femoral condyle and tibial plateau fracture of the left knee, the patient is advised to start physical therapy and wean out of the brace. She will return to the office in a period of four weeks.

## 2024-04-10 NOTE — ADDENDUM
[FreeTextEntry1] : This note was written by Ina Ahmadi on 04/10/2024 acting as scribe for Codie Whittington, OTR/L, PA

## 2024-04-10 NOTE — HISTORY OF PRESENT ILLNESS
[de-identified] : The patient comes in today for a post medial condyle and tibial plateau fracture of the left knee. She is still limping significantly and she is  throughout the area.

## 2024-04-10 NOTE — PHYSICAL EXAM
[de-identified] : Left Knee: She has no calf tenderness. She has good distal pulses. She has decreased strength in the left lower extremity as compared to the right lower extremity. Range of motion is not assessed secondary to fracture. [de-identified] : The  patient walks with an antalgic gait pattern, favoring the left lower extremity. [de-identified] : Radiographs, which were taken in the office today, two views of the left knee, reveals a healing fracture.

## 2024-05-02 ENCOUNTER — RX RENEWAL (OUTPATIENT)
Age: 69
End: 2024-05-02

## 2024-05-02 RX ORDER — LISINOPRIL 10 MG/1
10 TABLET ORAL
Qty: 90 | Refills: 1 | Status: ACTIVE | COMMUNITY
Start: 2024-02-09 | End: 1900-01-01

## 2024-05-07 NOTE — REVIEW OF SYSTEMS
Quality 130: Documentation Of Current Medications In The Medical Record: Current Medications Documented Quality 47: Advance Care Plan: Advance Care Planning discussed and documented; advance care plan or surrogate decision maker documented in the medical record. Quality 226: Preventive Care And Screening: Tobacco Use: Screening And Cessation Intervention: Patient screened for tobacco use and is an ex/non-smoker [Negative] : Heme/Lymph Detail Level: Detailed [FreeTextEntry9] : As noted in HPI

## 2024-05-08 ENCOUNTER — APPOINTMENT (OUTPATIENT)
Dept: ORTHOPEDIC SURGERY | Facility: CLINIC | Age: 69
End: 2024-05-08
Payer: COMMERCIAL

## 2024-05-08 VITALS — BODY MASS INDEX: 30.18 KG/M2 | WEIGHT: 164 LBS | HEIGHT: 62 IN

## 2024-05-08 PROCEDURE — 73560 X-RAY EXAM OF KNEE 1 OR 2: CPT | Mod: 26,LT

## 2024-05-08 PROCEDURE — 99213 OFFICE O/P EST LOW 20 MIN: CPT

## 2024-05-13 VITALS — WEIGHT: 164 LBS | BODY MASS INDEX: 30.18 KG/M2 | HEIGHT: 62 IN

## 2024-05-13 NOTE — DISCUSSION/SUMMARY
[de-identified] : At this time, due to a medial tibial plateau fracture and osteoarthritis of the left knee, the patient will continue physical therapy. She will be out of work at this time. She will return to the office in a period of four weeks.

## 2024-05-13 NOTE — PHYSICAL EXAM
[de-identified] : Left Knee:  Range of Motion in Degrees                                                   Claimant:                Normal:  Flexion Active:                             135                  135-degrees  Flexion Passive:                          135                  135-degrees  Extension Active:                         0-5                   0-5-degrees  Extension Passive:                      0-5                   0-5-degrees     We took the brace off. She has good range of motion, but she is still weak in the left knee, quad and hamstring. No evidence of instability in the AP plane or varus or valgus stress.  Negative Lachman.  Negative pivot shift.  Negative anterior drawer test.  Negative posterior drawer test.  Negative Bang.  Negative Apley grind.  No medial or lateral joint line tenderness.  Positive tenderness over the medial and lateral facet of the patella.  Positive patellofemoral crepitations.  No lateral tilting patella.  No patella apprehension.  Positive crepitation in the medial and lateral femoral condyle.  No proximal or distal swelling, edema or tenderness.  No gross motor or sensory deficits.  Mild intra-articular swelling.  2+ DP and PT pulses.  No varus or valgus malalignment.          [de-identified] : Ambulating with a slightly antalgic to antalgic gait.  Station:  Normal.  [de-identified] : Appearance:  Well-developed, well-nourished female in no acute distress.   [de-identified] : Radiographs, which were taken in the office today, two views of the left knee, reveals status post a medial condylar tibial plateau fracture. She also has osteoarthritis of the left knee.

## 2024-05-13 NOTE — ADDENDUM
[FreeTextEntry1] : This note was written by Ina Ahmadi on 05/13/2024 acting as scribe for Codie Whittington, OTR/L, PA

## 2024-05-21 ENCOUNTER — APPOINTMENT (OUTPATIENT)
Dept: ORTHOPEDIC SURGERY | Facility: CLINIC | Age: 69
End: 2024-05-21
Payer: COMMERCIAL

## 2024-05-21 DIAGNOSIS — S82.132A DISPLACED FRACTURE OF MEDIAL CONDYLE OF LEFT TIBIA, INITIAL ENCOUNTER FOR CLOSED FRACTURE: ICD-10-CM

## 2024-05-21 PROCEDURE — 99441: CPT

## 2024-05-23 ENCOUNTER — APPOINTMENT (OUTPATIENT)
Dept: MRI IMAGING | Facility: CLINIC | Age: 69
End: 2024-05-23
Payer: COMMERCIAL

## 2024-05-23 ENCOUNTER — RESULT REVIEW (OUTPATIENT)
Age: 69
End: 2024-05-23

## 2024-05-23 ENCOUNTER — OUTPATIENT (OUTPATIENT)
Dept: OUTPATIENT SERVICES | Facility: HOSPITAL | Age: 69
LOS: 1 days | End: 2024-05-23
Payer: COMMERCIAL

## 2024-05-23 DIAGNOSIS — Z98.89 OTHER SPECIFIED POSTPROCEDURAL STATES: Chronic | ICD-10-CM

## 2024-05-23 DIAGNOSIS — Z90.710 ACQUIRED ABSENCE OF BOTH CERVIX AND UTERUS: Chronic | ICD-10-CM

## 2024-05-23 DIAGNOSIS — S82.132A DISPLACED FRACTURE OF MEDIAL CONDYLE OF LEFT TIBIA, INITIAL ENCOUNTER FOR CLOSED FRACTURE: ICD-10-CM

## 2024-05-23 PROCEDURE — 73721 MRI JNT OF LWR EXTRE W/O DYE: CPT | Mod: 26,LT

## 2024-05-23 PROCEDURE — 73721 MRI JNT OF LWR EXTRE W/O DYE: CPT

## 2024-05-29 ENCOUNTER — APPOINTMENT (OUTPATIENT)
Dept: ORTHOPEDIC SURGERY | Facility: CLINIC | Age: 69
End: 2024-05-29
Payer: COMMERCIAL

## 2024-05-29 VITALS — HEIGHT: 62 IN | WEIGHT: 164 LBS | BODY MASS INDEX: 30.18 KG/M2

## 2024-05-29 PROBLEM — S82.132A CLOSED FRACTURE OF MEDIAL PORTION OF LEFT TIBIAL PLATEAU, INITIAL ENCOUNTER: Status: ACTIVE | Noted: 2024-02-05

## 2024-05-29 PROCEDURE — 99213 OFFICE O/P EST LOW 20 MIN: CPT

## 2024-05-29 RX ORDER — HYALURONATE SODIUM 10 MG/ML
25 SYRINGE (ML) INTRAARTICULAR
Qty: 5 | Refills: 0 | Status: ACTIVE | OUTPATIENT
Start: 2024-05-29

## 2024-05-30 ENCOUNTER — APPOINTMENT (OUTPATIENT)
Dept: ORTHOPEDIC SURGERY | Facility: CLINIC | Age: 69
End: 2024-05-30
Payer: COMMERCIAL

## 2024-05-30 VITALS
WEIGHT: 164 LBS | HEART RATE: 71 BPM | HEIGHT: 62 IN | SYSTOLIC BLOOD PRESSURE: 123 MMHG | DIASTOLIC BLOOD PRESSURE: 78 MMHG | BODY MASS INDEX: 30.18 KG/M2

## 2024-05-30 DIAGNOSIS — S72.415A NONDISPLACED UNSPECIFIED CONDYLE FRACTURE OF LOWER END OF LEFT FEMUR, INITIAL ENCOUNTER FOR CLOSED FRACTURE: ICD-10-CM

## 2024-05-30 PROCEDURE — 73560 X-RAY EXAM OF KNEE 1 OR 2: CPT | Mod: LT

## 2024-05-30 PROCEDURE — 99213 OFFICE O/P EST LOW 20 MIN: CPT

## 2024-06-03 NOTE — PHYSICAL EXAM
[de-identified] : Left Knee: Range of Motion in Degrees       Claimant:  Normal: Flexion Active:    135   135-degrees Flexion Passive:    135   135-degrees Extension Active:    0-5   0-5-degrees Extension Passive:   0-5   0-5-degrees  We took the brace off. She has good range of motion, but she is still weak in the left knee, quad and hamstring. No evidence of instability in the AP plane or varus or valgus stress. Negative Lachman. Negative pivot shift. Negative anterior drawer test. Negative posterior drawer test. Negative Bang. Negative Apley grind. No medial or lateral joint line tenderness. Positive tenderness over the medial and lateral facet of the patella. Positive patellofemoral crepitations. No lateral tilting patella. No patella apprehension. Positive crepitation in the medial and lateral femoral condyle. No proximal or distal swelling, edema or tenderness. No gross motor or sensory deficits. Mild intra-articular swelling. 2+ DP and PT pulses. No varus or valgus malalignment.   [de-identified] : Ambulating with a slightly antalgic to antalgic gait.  Station:  Normal.  [de-identified] : Appearance:  Well-developed, well-nourished female in no acute distress.   [de-identified] : Review of the MRI reveals that she had acute on chronic subchondral insufficiency fracture with a meniscus tear and arthritis of the left knee.

## 2024-06-03 NOTE — ADDENDUM
[FreeTextEntry1] : This note was written by Ina Ahmadi on 06/03/2024 acting as scribe for Codie Whittington, OTR/L, PA

## 2024-06-03 NOTE — HISTORY OF PRESENT ILLNESS
[de-identified] : The patient comes in today for her left knee and to review the MRI of her left knee (as noted below). She is still having a significant amount of pain across the plateau area and in the femoral condyle area. She can't actually put weight to it going up and down. This has been going on since March.

## 2024-06-03 NOTE — DISCUSSION/SUMMARY
[de-identified] : At this time, due to a medial tibial plateau fracture and osteoarthritis of the left knee, we did order injections for her left knee, but we will have her come in tomorrow to review what the MRI looks like. I didn't see the pictures, just the report and I wanted to review the actual images.

## 2024-06-04 NOTE — ADDENDUM
[FreeTextEntry1] : This note was dictated by Codie Whittington, OTR/L, PA   This note was written by Zehra Albarran on 06/04/2024 acting as scribe for Salvador Briggs III, MD

## 2024-06-04 NOTE — PHYSICAL EXAM
[Other: ___] : [unfilled] [de-identified] : Left Knee: Knee:  Range of Motion in Degrees:       Claimant:  Normal: Flexion Active:    135   135-degrees Flexion Passive:    135   135-degrees Extension Active:    0-5   0-5-degrees Extension Passive:   0-5   0-5-degrees  We took the brace off. She has good range of motion, but she is still weak in the left knee, quad and hamstring. No evidence of instability in the AP plane or varus or valgus stress. Negative Lachman. Negative pivot shift. Negative anterior drawer test. Negative posterior drawer test. Negative Bang. Negative Apley grind. No medial or lateral joint line tenderness. Positive tenderness over the medial and lateral facet of the patella. Positive patellofemoral crepitations. No lateral tilting patella. No patella apprehension. Positive crepitation in the medial and lateral femoral condyle. No proximal or distal swelling, edema or tenderness. No gross motor or sensory deficits. Mild intra-articular swelling. 2+ DP and PT pulses. No varus or valgus malalignment.   [de-identified] : Appearance:  Well-developed, well-nourished female in no acute distress.   [de-identified] : Review of MRI of the left knee shows an insufficiency fracture of the medial femoral condyle and osteoarthritis.    Radiographs, one to two views of the left knee taken in the office today, show moderate to severe osteoarthritis, mostly on the medial side.

## 2024-06-04 NOTE — DISCUSSION/SUMMARY
[de-identified] : The patient presents with osteoarthritis of the left knee with medial femoral condyle insufficiency fracture.  We ordered the gel injections yesterday and she will start the injections next week.  We will put her back into a [employer] brace and will see how she is doing.  We did discuss a total knee replacement.

## 2024-06-04 NOTE — HISTORY OF PRESENT ILLNESS
[de-identified] : The patient comes in today for her left knee.  We reviewed the MRI again with Dr. Briggs, which is as noted below.

## 2024-06-11 RX ORDER — HYALURONATE SODIUM 10 MG/ML
25 SYRINGE (ML) INTRAARTICULAR
Qty: 5 | Refills: 0 | Status: ACTIVE | OUTPATIENT
Start: 2024-06-11

## 2024-06-12 ENCOUNTER — APPOINTMENT (OUTPATIENT)
Dept: ORTHOPEDIC SURGERY | Facility: CLINIC | Age: 69
End: 2024-06-12
Payer: COMMERCIAL

## 2024-06-12 PROCEDURE — 20610 DRAIN/INJ JOINT/BURSA W/O US: CPT | Mod: LT

## 2024-06-17 NOTE — ADDENDUM
[FreeTextEntry1] : This note was written by Ina Ahmadi on 06/17/2024 acting as scribe for Codie Whittington, OTR/L, PA

## 2024-06-17 NOTE — PHYSICAL EXAM
[de-identified] : Left Knee: Range of Motion in Degrees:  Claimant: Normal: Flexion Active: 135 135-degrees Flexion Passive: 135 135-degrees Extension Active: 0-5 0-5-degrees Extension Passive: 0-5 0-5-degrees  We took the brace off. She has good range of motion, but she is still weak in the left knee, quad and hamstring. No evidence of instability in the AP plane or varus or valgus stress. Negative Lachman. Negative pivot shift. Negative anterior drawer test. Negative posterior drawer test. Negative Bang. Negative Apley grind. No medial or lateral joint line tenderness. Positive tenderness over the medial and lateral facet of the patella. Positive patellofemoral crepitations. No lateral tilting patella. No patella apprehension. Positive crepitation in the medial and lateral femoral condyle. No proximal or distal swelling, edema or tenderness. No gross motor or sensory deficits. Mild intra-articular swelling. 2+ DP and PT pulses. No varus or valgus malalignment.

## 2024-06-17 NOTE — PROCEDURE
[de-identified] : Indication: Osteoarthritis of the left knee Consent: The risks and benefits of the procedure were discussed with the patient in detail.  Upon verbal consent of the patient, we proceeded with the GenVisc 850 injection as noted below.  Description of Procedure: After a sterile prep, the patient underwent a GenVisc 850 injection of 25 mg of Sodium Hyaluronate in a 2.5 ML syringe into the left knee.  The patient tolerated the procedure well.  There were no complications.    :  [employer] NDC #:  [id number] Lot #:     [id number] Expiration: 09/30/2026  Plan: The patient will be reassessed in a week for the next GenVisc 850 injection for osteoarthritis of the left knee.  I have recommended ice and elevation.

## 2024-06-19 ENCOUNTER — APPOINTMENT (OUTPATIENT)
Dept: ORTHOPEDIC SURGERY | Facility: CLINIC | Age: 69
End: 2024-06-19

## 2024-06-19 PROCEDURE — 20610 DRAIN/INJ JOINT/BURSA W/O US: CPT | Mod: LT

## 2024-06-26 ENCOUNTER — APPOINTMENT (OUTPATIENT)
Dept: ORTHOPEDIC SURGERY | Facility: CLINIC | Age: 69
End: 2024-06-26

## 2024-06-26 DIAGNOSIS — M17.12 UNILATERAL PRIMARY OSTEOARTHRITIS, LEFT KNEE: ICD-10-CM

## 2024-06-26 PROCEDURE — 20610 DRAIN/INJ JOINT/BURSA W/O US: CPT | Mod: LT

## 2024-07-01 PROBLEM — M17.12 PRIMARY OSTEOARTHRITIS OF LEFT KNEE: Status: ACTIVE | Noted: 2024-01-22

## 2024-07-03 ENCOUNTER — APPOINTMENT (OUTPATIENT)
Dept: ORTHOPEDIC SURGERY | Facility: CLINIC | Age: 69
End: 2024-07-03
Payer: COMMERCIAL

## 2024-07-03 PROCEDURE — 20610 DRAIN/INJ JOINT/BURSA W/O US: CPT | Mod: LT

## 2024-07-10 ENCOUNTER — APPOINTMENT (OUTPATIENT)
Dept: ORTHOPEDIC SURGERY | Facility: CLINIC | Age: 69
End: 2024-07-10
Payer: COMMERCIAL

## 2024-07-10 DIAGNOSIS — M17.12 UNILATERAL PRIMARY OSTEOARTHRITIS, LEFT KNEE: ICD-10-CM

## 2024-07-10 PROCEDURE — 20610 DRAIN/INJ JOINT/BURSA W/O US: CPT | Mod: LT

## 2024-07-15 ENCOUNTER — RX RENEWAL (OUTPATIENT)
Age: 69
End: 2024-07-15

## 2024-07-22 ENCOUNTER — RX RENEWAL (OUTPATIENT)
Age: 69
End: 2024-07-22

## 2024-07-29 ENCOUNTER — RX RENEWAL (OUTPATIENT)
Age: 69
End: 2024-07-29

## 2024-07-31 ENCOUNTER — APPOINTMENT (OUTPATIENT)
Dept: ORTHOPEDIC SURGERY | Facility: CLINIC | Age: 69
End: 2024-07-31

## 2024-07-31 VITALS — BODY MASS INDEX: 30.18 KG/M2 | WEIGHT: 164 LBS | HEIGHT: 62 IN

## 2024-07-31 DIAGNOSIS — S72.415A NONDISPLACED UNSPECIFIED CONDYLE FRACTURE OF LOWER END OF LEFT FEMUR, INITIAL ENCOUNTER FOR CLOSED FRACTURE: ICD-10-CM

## 2024-07-31 DIAGNOSIS — M17.12 UNILATERAL PRIMARY OSTEOARTHRITIS, LEFT KNEE: ICD-10-CM

## 2024-07-31 PROCEDURE — 99213 OFFICE O/P EST LOW 20 MIN: CPT

## 2024-08-05 VITALS — BODY MASS INDEX: 30.18 KG/M2 | HEIGHT: 62 IN | WEIGHT: 164 LBS

## 2024-08-05 NOTE — DISCUSSION/SUMMARY
[de-identified] : At this time, due to osteoarthritis of the left knee and a closed fracture of the left femoral condyle, she doesn't think that she can go back to work because the pain is so bad throughout the day, and she has long days as a nurse. She recently did the gel injections, which helped a little bit, but she is still having significant pain, especially on the medial joint line. She does have significant arthritis, and we did discuss a total knee replacement, which she is considering after her daughter's wedding in October. We will put her on long term disability at this time. She will follow up in a few months.

## 2024-08-05 NOTE — PHYSICAL EXAM
[de-identified] : Left Knee: Range of Motion in Degrees:   Claimant: Normal: Flexion Active 135   135-degrees Flexion Passive 135   135-degrees Extension Active 0-5   0-5-degrees Extension Passive 0-5   0-5-degrees  No weakness to flexion/extension. No evidence of instability in the AP plane or varus or valgus stress. Negative Lachman. Negative pivot shift. Negative anterior drawer test. Negative posterior drawer test. Negative Bang. Negative Apley grind. No medial or lateral joint line tenderness. Positive tenderness over the medial and lateral facet of the patella. Positive patellofemoral crepitations. No lateral tilting patella. No patella apprehension. Positive crepitation in the medial and lateral femoral condyle. No proximal or distal swelling, edema or tenderness. No gross motor or sensory deficits. Mild intra-articular swelling. 2+ DP and PT pulses. No varus or valgus malalignment.    [de-identified] : Ambulating with a slightly antalgic to antalgic gait.  Station:  Normal.  [de-identified] : Appearance:  Well-developed, well-nourished female in no acute distress.

## 2024-08-05 NOTE — HISTORY OF PRESENT ILLNESS
[de-identified] : The patient comes in today for her left knee. She is having significant pain still. She is status post osteoarthritis of the left knee and a closed fracture of the left femoral condyle. The stress fracture is feeling better, but she is having significant pain where the arthritis is. She states she has been wearing good sneakers.

## 2024-08-05 NOTE — PHYSICAL EXAM
[de-identified] : Left Knee: Range of Motion in Degrees:   Claimant: Normal: Flexion Active 135   135-degrees Flexion Passive 135   135-degrees Extension Active 0-5   0-5-degrees Extension Passive 0-5   0-5-degrees  No weakness to flexion/extension. No evidence of instability in the AP plane or varus or valgus stress. Negative Lachman. Negative pivot shift. Negative anterior drawer test. Negative posterior drawer test. Negative Bang. Negative Apley grind. No medial or lateral joint line tenderness. Positive tenderness over the medial and lateral facet of the patella. Positive patellofemoral crepitations. No lateral tilting patella. No patella apprehension. Positive crepitation in the medial and lateral femoral condyle. No proximal or distal swelling, edema or tenderness. No gross motor or sensory deficits. Mild intra-articular swelling. 2+ DP and PT pulses. No varus or valgus malalignment.    [de-identified] : Ambulating with a slightly antalgic to antalgic gait.  Station:  Normal.  [de-identified] : Appearance:  Well-developed, well-nourished female in no acute distress.

## 2024-08-05 NOTE — DISCUSSION/SUMMARY
[de-identified] : At this time, due to osteoarthritis of the left knee and a closed fracture of the left femoral condyle, she doesn't think that she can go back to work because the pain is so bad throughout the day, and she has long days as a nurse. She recently did the gel injections, which helped a little bit, but she is still having significant pain, especially on the medial joint line. She does have significant arthritis, and we did discuss a total knee replacement, which she is considering after her daughter's wedding in October. We will put her on long term disability at this time. She will follow up in a few months.

## 2024-08-05 NOTE — ADDENDUM
[FreeTextEntry1] : This note was written by nIa Ahmadi on 08/05/2024 acting as scribe for Codie Whittington, OTR/L, PA

## 2024-08-05 NOTE — ADDENDUM
[FreeTextEntry1] : This note was written by Ina Ahmadi on 08/05/2024 acting as scribe for Codie Whittington, OTR/L, PA

## 2024-08-05 NOTE — HISTORY OF PRESENT ILLNESS
[de-identified] : The patient comes in today for her left knee. She is having significant pain still. She is status post osteoarthritis of the left knee and a closed fracture of the left femoral condyle. The stress fracture is feeling better, but she is having significant pain where the arthritis is. She states she has been wearing good sneakers.

## 2024-09-03 ENCOUNTER — APPOINTMENT (OUTPATIENT)
Dept: ORTHOPEDIC SURGERY | Facility: CLINIC | Age: 69
End: 2024-09-03
Payer: MEDICARE

## 2024-09-03 VITALS
SYSTOLIC BLOOD PRESSURE: 155 MMHG | BODY MASS INDEX: 30.18 KG/M2 | DIASTOLIC BLOOD PRESSURE: 85 MMHG | WEIGHT: 164 LBS | HEIGHT: 62 IN

## 2024-09-03 PROCEDURE — 99205 OFFICE O/P NEW HI 60 MIN: CPT

## 2024-09-03 PROCEDURE — G2211 COMPLEX E/M VISIT ADD ON: CPT

## 2024-09-03 PROCEDURE — 73564 X-RAY EXAM KNEE 4 OR MORE: CPT | Mod: LT

## 2024-09-03 NOTE — DISCUSSION/SUMMARY
[de-identified] :  GRACIELA CABRERA is a 69 year old female who presents with left knee bone on bone varus arthritis with MFC osteochondral defect related to subchondral fracture.  The patient has exhausted a minimum of 3 months conservative treatment including prior injections, physical therapy, over the counter NSAIDS and pain medication where indicated. In addition, the patient's quality of life is diminished due to significant chronic pain. The patient is having difficulty ambulating, descending stairs, and rising from a seated position. Based upon the patient's continued symptoms and failure to respond to conservative treatment, I have recommended a left total knee replacement for this patient. A long discussion took place with the patient describing what a total joint replacement is and what the procedure would entail. A knee model, similar to the implants that will be used during the operation, was utilized to demonstrate the implants. Choices of implant manufactures were discussed and reviewed. The ability to secure the implant utilizing cement or cementless (press fit) fixation was discussed. The patient agrees with the plan of care as well as the use of implants.   The hospitalization and post-operative care and rehabilitation were also discussed. The use of perioperative antibiotics and DVT prophylaxis were discussed. The risk, benefits and alternatives to a surgical intervention were discussed at length with the patient. The patient was also advised of risks related to the medical comorbidities and elevated body mass index (BMI). A lengthy discussion took place to review the most common complications including but not limited to: deep vein thrombosis, pulmonary embolus, heart attack, stroke, infection, wound breakdown, numbness, damage to nerves, tendon, muscles, arteries or other blood vessels, death and other possible complications from anesthesia. The patient was told that we will take steps to minimize these risks by using sterile technique, antibiotics and DVT prophylaxis when appropriate and follow the patient postoperatively in the office setting to monitor progress. The possibility of recurrent pain, no improvement in pain and actual worsening of pain were also discussed with the patient.   The discharge plan of care focused on the patient going home following surgery. The patient was encouraged to make the necessary arrangements to have someone stay with them when they are discharged home. Following discharge, a home care nurse will visit the patient. The home care nurse will open your home care case and request home physical therapy services. Home physical therapy will commence following discharge provided it is appropriate and covered by the health insurance benefit plan.   The benefits of surgery were discussed with the patient including the potential for improving the patient's current clinical condition through operative intervention. Alternatives to surgical intervention including continued conservative management were also discussed in detail. All questions were answered to the satisfaction of the patient. The treatment plan of care, as well as a model of a total knee equivalent to the one that will be used for their total joint replacement, was shared with the patient. The patient participated and agreed to the plan of care as well as the use of the recommended implants for their total joint replacement surgery.   We discussed that the knee replacement will be done with robotic assistance to enhance accuracy and dynamic joint balancing.

## 2024-09-03 NOTE — HISTORY OF PRESENT ILLNESS
[de-identified] :  GRACIELA CABRERA is a 69 year old female who presents with Left knee pain.  Her knee pain began in January 2024 when she felt sharp pain in her left knee while putting on a boot.  She wanted going for MRI revealing medial meniscus tearing as well as a subchondral insufficiency fracture of the medial femoral condyle.  She was put in an immobilizer by another orthopedist then began physical therapy a few weeks later.  She had multiple rounds of injections including gel and cortisone, the most recent injection was gel ending in July 2024.  She reports little to no improvement with this.  Patient unfortunately also did not improve with physical therapy.  She tried anti-inflammatories and Tylenol without significant improvement.  She is hopeful to discuss knee replacement. Past medical history includes MI in 2023 resulting in a stent, currently on aspirin 81.

## 2024-09-03 NOTE — ADDENDUM
[FreeTextEntry1] :  I personally performed the services described in the documentation, reviewed the documentation recorded by the scribe in my presence and it accurately and completely records my words and actions.
negative

## 2024-09-03 NOTE — CONSULT LETTER
[Dear  ___] : Dear  [unfilled], [Consult Letter:] : I had the pleasure of evaluating your patient, [unfilled]. [Please see my note below.] : Please see my note below. [Consult Closing:] : Thank you very much for allowing me to participate in the care of this patient.  If you have any questions, please do not hesitate to contact me. [Sincerely,] : Sincerely, [FreeTextEntry2] : VIRAL PATTERSON MD [FreeTextEntry3] : Zaki Greene MD Chief of Joint Replacement Primary & Revision Hip and Knee Replacement  [facility]

## 2024-09-03 NOTE — PHYSICAL EXAM
[de-identified] :  The patient appears well nourished and in no apparent distress. The patient is alert and oriented to person, place, and time. Affect and mood appear normal. The head is normocephalic and atraumatic. The eyes reveal normal sclera and extra ocular muscles are intact. The tongue is midline with no apparent lesions. Skin shows normal turgor with no evidence of eczema or psoriasis. No respiratory distress noted. Sensation grossly intact. [de-identified] :  Exam of the left knee shows a varus alignment which is correctable, 0 to 128 degrees of flexion measured with a goniometer.  There is an effusion. 5/5 motor strength bilaterally distally. Sensation intact distally. [de-identified] : X-ray: 4 views of the left knee demonstrate bone on bone varus arthritis   Left knee MRI done by [facility] on 5/23/24  Impression and results from my independent review in office today and radiology report: 1.  Acute on chronic subchondral insufficiency fracture along left weightbearing medial femoral condyle. 2.  Interval progression of moderate medial femorotibial and patellofemoral compartments left knee arthrosis with small volume effusion and active synovitis. 3.  Unchanged chronic at least partial-thickness posterior root tear of left medial meniscus. Grade 1 subacute on grade 2 chronic sprains of overlying MCL and posteromedial corner. Focal tendinitis and grade 2 subacute on grade 2 chronic strain at the femoral origin of the medial head of the gastrocnemius. 4.  Interval worsening of now moderate left distal ITB syndrome in the proper clinical setting.

## 2024-09-23 ENCOUNTER — NON-APPOINTMENT (OUTPATIENT)
Age: 69
End: 2024-09-23

## 2024-09-30 ENCOUNTER — NON-APPOINTMENT (OUTPATIENT)
Age: 69
End: 2024-09-30

## 2024-10-01 ENCOUNTER — APPOINTMENT (OUTPATIENT)
Dept: CARDIOLOGY | Facility: CLINIC | Age: 69
End: 2024-10-01

## 2024-10-01 ENCOUNTER — NON-APPOINTMENT (OUTPATIENT)
Age: 69
End: 2024-10-01

## 2024-10-01 VITALS
BODY MASS INDEX: 30.18 KG/M2 | HEIGHT: 62 IN | DIASTOLIC BLOOD PRESSURE: 83 MMHG | WEIGHT: 164 LBS | SYSTOLIC BLOOD PRESSURE: 135 MMHG | OXYGEN SATURATION: 99 % | HEART RATE: 60 BPM

## 2024-10-01 DIAGNOSIS — I25.10 ATHEROSCLEROTIC HEART DISEASE OF NATIVE CORONARY ARTERY W/OUT ANGINA PECTORIS: ICD-10-CM

## 2024-10-01 DIAGNOSIS — E78.5 HYPERLIPIDEMIA, UNSPECIFIED: ICD-10-CM

## 2024-10-01 DIAGNOSIS — Z01.810 ENCOUNTER FOR PREPROCEDURAL CARDIOVASCULAR EXAMINATION: ICD-10-CM

## 2024-10-01 DIAGNOSIS — I10 ESSENTIAL (PRIMARY) HYPERTENSION: ICD-10-CM

## 2024-10-01 PROCEDURE — 93000 ELECTROCARDIOGRAM COMPLETE: CPT

## 2024-10-01 PROCEDURE — 99204 OFFICE O/P NEW MOD 45 MIN: CPT

## 2024-10-11 ENCOUNTER — RX RENEWAL (OUTPATIENT)
Age: 69
End: 2024-10-11

## 2024-10-11 PROBLEM — Z01.810 PRE-OPERATIVE CARDIOVASCULAR EXAMINATION: Status: ACTIVE | Noted: 2024-10-11

## 2024-10-11 NOTE — DISCUSSION/SUMMARY
[EKG obtained to assist in diagnosis and management of assessed problem(s)] : EKG obtained to assist in diagnosis and management of assessed problem(s) [FreeTextEntry1] : This is a 70 yo woman with pre-DM (HbA1C 6.0 2020 ), HLD on statin, stable CAD s/p NSTEMI with LAD STEPHEN placement 12/2019. Estefania is doing well from the cardiac standpoint.    CAD s/p NSTEMI with mild LV dysfunction -Continue ASA.   -Continue  ACEi.  Previously BB was discontinued due to bradycardia.   -TTE most recently showed preserved EF, Normal RV and LV, mild MR, moderate TR 12/2023   HLD  03/24 -Continue statin.  Reiterated the importance of compliance, she said she will restart.  Lipitor previously decreased to 40 mg po daily.    HTN slightly above goal.   -For now cont ACEi and she will do BP monitor and if it remains elevated, she will notify me to adjust meds.  RCRI 1 points, Class II risk 6% 30-day risk of death, MI, or cardiac arrest.  From cardiac standpoint pt is at acceptable risk for knee replacement.    Follow with me in 6-9 months.  Discussed lifestyle changes, risk factor modifications, regular exercise, etc given her cardiac event.

## 2024-10-11 NOTE — DISCUSSION/SUMMARY
[EKG obtained to assist in diagnosis and management of assessed problem(s)] : EKG obtained to assist in diagnosis and management of assessed problem(s) [FreeTextEntry1] : This is a 68 yo woman with pre-DM (HbA1C 6.0 2020 ), HLD on statin, stable CAD s/p NSTEMI with LAD STEPHEN placement 12/2019. Estefania is doing well from the cardiac standpoint.    CAD s/p NSTEMI with mild LV dysfunction -Continue ASA.   -Continue  ACEi.  Previously BB was discontinued due to bradycardia.   -TTE most recently showed preserved EF, Normal RV and LV, mild MR, moderate TR 12/2023   HLD  03/24 -Continue statin.  Reiterated the importance of compliance, she said she will restart.  Lipitor previously decreased to 40 mg po daily.    HTN slightly above goal.   -For now cont ACEi and she will do BP monitor and if it remains elevated, she will notify me to adjust meds.  RCRI 1 points, Class II risk 6% 30-day risk of death, MI, or cardiac arrest.  From cardiac standpoint pt is at acceptable risk for knee replacement.    Follow with me in 6-9 months.  Discussed lifestyle changes, risk factor modifications, regular exercise, etc given her cardiac event.

## 2024-10-11 NOTE — HISTORY OF PRESENT ILLNESS
[FreeTextEntry1] : This is a 68 yo woman with pre-DM (HbA1C 6.0 2020), HLD on statin, stable CAD s/p NSTEMI  with LAD STEPHEN 12/2019.  Pt presented with NSTEMI back in December, 2019.  She subsequently underwent cardiac cath which showed LAD disease and STEPHEN was placed.  She had mild LV dysfunction on TTE.  Pt reports compliance with meds.  She denies any CP, SOB, WAN, LE edema, PND, orthopnea.  Had CP back in 01/2024 went to [place] and had nuclear stress test a which was negative.  Was also post covid.  Dr. Zaki Garcia at [facility] for L knee replacement 11/26/24.  Admits to being non complaint with statin.

## 2024-10-11 NOTE — HISTORY OF PRESENT ILLNESS
[FreeTextEntry1] : This is a 68 yo woman with pre-DM (HbA1C 6.0 2020), HLD on statin, stable CAD s/p NSTEMI  with LAD STEPHEN 12/2019.  Pt presented with NSTEMI back in December, 2019.  She subsequently underwent cardiac cath which showed LAD disease and STEPHEN was placed.  She had mild LV dysfunction on TTE.  Pt reports compliance with meds.  She denies any CP, SOB, WAN, LE edema, PND, orthopnea.  Had CP back in 01/2024 went to [place] and had nuclear stress test a which was negative.  Was also post covid.  Dr. Zaki Garcia at [facility] for L knee replacement 11/26/24.  Admits to being non complaint with statin.

## 2024-10-24 ENCOUNTER — OUTPATIENT (OUTPATIENT)
Dept: OUTPATIENT SERVICES | Facility: HOSPITAL | Age: 69
LOS: 1 days | End: 2024-10-24
Payer: MEDICARE

## 2024-10-24 VITALS
SYSTOLIC BLOOD PRESSURE: 143 MMHG | TEMPERATURE: 97 F | RESPIRATION RATE: 16 BRPM | DIASTOLIC BLOOD PRESSURE: 79 MMHG | HEIGHT: 63 IN | OXYGEN SATURATION: 97 % | WEIGHT: 149.91 LBS | HEART RATE: 56 BPM

## 2024-10-24 DIAGNOSIS — Z98.89 OTHER SPECIFIED POSTPROCEDURAL STATES: Chronic | ICD-10-CM

## 2024-10-24 DIAGNOSIS — I10 ESSENTIAL (PRIMARY) HYPERTENSION: ICD-10-CM

## 2024-10-24 DIAGNOSIS — Z90.710 ACQUIRED ABSENCE OF BOTH CERVIX AND UTERUS: Chronic | ICD-10-CM

## 2024-10-24 DIAGNOSIS — M17.12 UNILATERAL PRIMARY OSTEOARTHRITIS, LEFT KNEE: ICD-10-CM

## 2024-10-24 DIAGNOSIS — Z01.818 ENCOUNTER FOR OTHER PREPROCEDURAL EXAMINATION: ICD-10-CM

## 2024-10-24 DIAGNOSIS — Z91.89 OTHER SPECIFIED PERSONAL RISK FACTORS, NOT ELSEWHERE CLASSIFIED: ICD-10-CM

## 2024-10-24 DIAGNOSIS — M47.816 SPONDYLOSIS WITHOUT MYELOPATHY OR RADICULOPATHY, LUMBAR REGION: ICD-10-CM

## 2024-10-24 DIAGNOSIS — Z29.9 ENCOUNTER FOR PROPHYLACTIC MEASURES, UNSPECIFIED: ICD-10-CM

## 2024-10-24 DIAGNOSIS — I25.10 ATHEROSCLEROTIC HEART DISEASE OF NATIVE CORONARY ARTERY WITHOUT ANGINA PECTORIS: ICD-10-CM

## 2024-10-24 LAB
A1C WITH ESTIMATED AVERAGE GLUCOSE RESULT: 5.9 % — HIGH (ref 4–5.6)
ANION GAP SERPL CALC-SCNC: 10 MMOL/L — SIGNIFICANT CHANGE UP (ref 5–17)
APTT BLD: 30.1 SEC — SIGNIFICANT CHANGE UP (ref 24.5–35.6)
BASOPHILS # BLD AUTO: 0.13 K/UL — SIGNIFICANT CHANGE UP (ref 0–0.2)
BASOPHILS NFR BLD AUTO: 1.7 % — SIGNIFICANT CHANGE UP (ref 0–2)
BLD GP AB SCN SERPL QL: SIGNIFICANT CHANGE UP
BUN SERPL-MCNC: 12.5 MG/DL — SIGNIFICANT CHANGE UP (ref 8–20)
CALCIUM SERPL-MCNC: 8.8 MG/DL — SIGNIFICANT CHANGE UP (ref 8.4–10.5)
CHLORIDE SERPL-SCNC: 102 MMOL/L — SIGNIFICANT CHANGE UP (ref 96–108)
CO2 SERPL-SCNC: 26 MMOL/L — SIGNIFICANT CHANGE UP (ref 22–29)
CREAT SERPL-MCNC: 0.61 MG/DL — SIGNIFICANT CHANGE UP (ref 0.5–1.3)
EGFR: 97 ML/MIN/1.73M2 — SIGNIFICANT CHANGE UP
EOSINOPHIL # BLD AUTO: 0.22 K/UL — SIGNIFICANT CHANGE UP (ref 0–0.5)
EOSINOPHIL NFR BLD AUTO: 2.9 % — SIGNIFICANT CHANGE UP (ref 0–6)
ESTIMATED AVERAGE GLUCOSE: 123 MG/DL — HIGH (ref 68–114)
GLUCOSE SERPL-MCNC: 114 MG/DL — HIGH (ref 70–99)
HCT VFR BLD CALC: 43.7 % — SIGNIFICANT CHANGE UP (ref 34.5–45)
HGB BLD-MCNC: 14.5 G/DL — SIGNIFICANT CHANGE UP (ref 11.5–15.5)
IMM GRANULOCYTES NFR BLD AUTO: 0.3 % — SIGNIFICANT CHANGE UP (ref 0–0.9)
INR BLD: 1.02 RATIO — SIGNIFICANT CHANGE UP (ref 0.85–1.16)
LYMPHOCYTES # BLD AUTO: 1.62 K/UL — SIGNIFICANT CHANGE UP (ref 1–3.3)
LYMPHOCYTES # BLD AUTO: 21.5 % — SIGNIFICANT CHANGE UP (ref 13–44)
MCHC RBC-ENTMCNC: 30.5 PG — SIGNIFICANT CHANGE UP (ref 27–34)
MCHC RBC-ENTMCNC: 33.2 GM/DL — SIGNIFICANT CHANGE UP (ref 32–36)
MCV RBC AUTO: 91.8 FL — SIGNIFICANT CHANGE UP (ref 80–100)
MONOCYTES # BLD AUTO: 0.68 K/UL — SIGNIFICANT CHANGE UP (ref 0–0.9)
MONOCYTES NFR BLD AUTO: 9 % — SIGNIFICANT CHANGE UP (ref 2–14)
MRSA PCR RESULT.: SIGNIFICANT CHANGE UP
NEUTROPHILS # BLD AUTO: 4.87 K/UL — SIGNIFICANT CHANGE UP (ref 1.8–7.4)
NEUTROPHILS NFR BLD AUTO: 64.6 % — SIGNIFICANT CHANGE UP (ref 43–77)
PLATELET # BLD AUTO: 315 K/UL — SIGNIFICANT CHANGE UP (ref 150–400)
POTASSIUM SERPL-MCNC: 4.3 MMOL/L — SIGNIFICANT CHANGE UP (ref 3.5–5.3)
POTASSIUM SERPL-SCNC: 4.3 MMOL/L — SIGNIFICANT CHANGE UP (ref 3.5–5.3)
PROTHROM AB SERPL-ACNC: 11.5 SEC — SIGNIFICANT CHANGE UP (ref 9.9–13.4)
RBC # BLD: 4.76 M/UL — SIGNIFICANT CHANGE UP (ref 3.8–5.2)
RBC # FLD: 12.3 % — SIGNIFICANT CHANGE UP (ref 10.3–14.5)
S AUREUS DNA NOSE QL NAA+PROBE: DETECTED
SODIUM SERPL-SCNC: 138 MMOL/L — SIGNIFICANT CHANGE UP (ref 135–145)
WBC # BLD: 7.54 K/UL — SIGNIFICANT CHANGE UP (ref 3.8–10.5)
WBC # FLD AUTO: 7.54 K/UL — SIGNIFICANT CHANGE UP (ref 3.8–10.5)

## 2024-10-24 RX ORDER — MUPIROCIN 20 MG/G
1 OINTMENT TOPICAL
Qty: 1 | Refills: 0
Start: 2024-10-24 | End: 2024-10-28

## 2024-10-24 NOTE — H&P PST ADULT - MUSCULOSKELETAL COMMENTS
Follow up with dr tracy to see if you need med for sugar  6 months labs and echo and duplex   left knee pain, lumbar herniated disc left knee tenderness to medial and lateral aspect, dec ROM due to pain with flexion and extension, skin intact, + crepitus

## 2024-10-24 NOTE — H&P PST ADULT - PROBLEM SELECTOR PLAN 5
Hx of STEPHEN, on aspirin, discuss with cardiologist  denies chest pain today at PST  recent stress test 1/24 negative for ischemia  cardiac pending

## 2024-10-24 NOTE — H&P PST ADULT - HISTORY OF PRESENT ILLNESS
69 year old female with pmhx of HTN, HLD, OA    IMPRESSION:  1.  Acute on chronic subchondral insufficiency fracture along left weightbearing medial femoral condyle.  2.  Interval progression of moderate medial femorotibial and patellofemoral compartments left knee arthrosis with small volume effusion and active synovitis.  3.  Unchanged chronic at least partial-thickness posterior root tear of left medial meniscus. Grade 1 subacute on grade 2 chronic sprains of overlying MCL and posteromedial corner. Focal tendinitis and grade 2 subacute on grade 2 chronic strain at the femoral origin of the medial head of the gastrocnemius.  4.  Interval worsening of now moderate left distal ITB syndrome in the proper clinical setting.    Patient is scheduled for l jermaine reed total knee replacement on 11/11/24 with Dr Greene.  Medical pending  69 year old female with pmhx of  stable CAD s/p NSTEMI with LAD STEPHEN   placement 12/2019.   HTN, HLD, osteoporosis taking prolia twice a year, OA  left knee pain since January 2024  has tried injections with  temporary relief     IMPRESSION:  1.  Acute on chronic subchondral insufficiency fracture along left weightbearing medial femoral condyle.  2.  Interval progression of moderate medial femorotibial and patellofemoral compartments left knee arthrosis with small volume effusion and active synovitis.  3.  Unchanged chronic at least partial-thickness posterior root tear of left medial meniscus. Grade 1 subacute on grade 2 chronic sprains of overlying MCL and posteromedial corner. Focal tendinitis and grade 2 subacute on grade 2 chronic strain at the femoral origin of the medial head of the gastrocnemius.  4.  Interval worsening of now moderate left distal ITB syndrome in the proper clinical setting.    wore brace for 8-10 weeks, tried PT  however repeat MRI revealed fracture was not healed   /10 in severity     Patient is scheduled for l eft derek total knee replacement on 11/11/24 with Dr Greene.  Medical pending  69 year old female with pmhx of stable CAD s/p NSTEMI with LAD STEPHEN placement 12/2019, lumbar spondylosis, HTN, HLD, osteoporosis taking prolia twice a year, OA.  Patient reports she had an injury January 2024 putting on a boot, imaging revealed fracture and torn meniscus, pt wore boot for 8-10 weeks, tried PT however pain persisted. Today she describes pain as sharp, 7/10 in severity, worse with stairs, standing from sitting, prolonged walking, has tried injections with temporary relief (last injection July 2024), motrin with some relief, has tried PT. MRI 5/29/24 revealed acute on chronic subchondral insufficiency fracture along left weightbearing medial femoral condyle, interval progression of moderate medial femorotibial and patellofemoral compartments left knee arthrosis with small volume effusion and active synovitis, unchanged chronic at least partial-thickness posterior root tear of left medial meniscus. Grade 1 subacute on grade 2 chronic sprains of overlying MCL and posteromedial corner. Focal tendinitis and grade 2 subacute on grade 2 chronic strain at the femoral origin of the medial head of the gastrocnemius. Interval worsening of now moderate left distal ITB syndrome in the proper clinical setting. Patient is scheduled for left derek total knee replacement on 11/11/24 with Dr Greene.  Medical pending

## 2024-10-24 NOTE — H&P PST ADULT - NSICDXPASTMEDICALHX_GEN_ALL_CORE_FT
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

## 2024-10-24 NOTE — H&P PST ADULT - PROBLEM SELECTOR PLAN 1
Patient is scheduled for left derek total knee replacement on 11/11/24 with Dr Greene.  Medical and cardiac pending

## 2024-10-24 NOTE — H&P PST ADULT - OTHER CARE PROVIDERS
Dr. LENA Cavanaugh # [phone] Dr. LENA Cavanaugh # [phone] PCP, Dr. [facility]

## 2024-10-24 NOTE — H&P PST ADULT - ASSESSMENT
CAPRINI SCORE    AGE RELATED RISK FACTORS                                                             [ ] Age 41-60 years                                            (1 Point)  [ ] Age: 61-74 years                                           (2 Points)                 [ ] Age= 75 years                                                (3 Points)             DISEASE RELATED RISK FACTORS                                                       [ ] Edema in the lower extremities                 (1 Point)                     [ ] Varicose veins                                               (1 Point)                                 [ ] BMI > 25 Kg/m2                                            (1 Point)                                  [ ] Serious infection (ie PNA)                            (1 Point)                     [ ] Lung disease ( COPD, Emphysema)            (1 Point)                                                                          [ ] Acute myocardial infarction                         (1 Point)                  [ ] Congestive heart failure (in the previous month)  (1 Point)         [ ] Inflammatory bowel disease                            (1 Point)                  [ ] Central venous access, PICC or Port               (2 points)       (within the last month)                                                                [ ] Stroke (in the previous month)                        (5 Points)    [ ] Previous or present malignancy                       (2 points)                                                                                                                                                         HEMATOLOGY RELATED FACTORS                                                         [ ] Prior episodes of VTE                                     (3 Points)                     [ ] Positive family history for VTE                      (3 Points)                  [ ] Prothrombin 34125 A                                     (3 Points)                     [ ] Factor V Leiden                                                (3 Points)                        [ ] Lupus anticoagulants                                      (3 Points)                                                           [ ] Anticardiolipin antibodies                              (3 Points)                                                       [ ] High homocysteine in the blood                   (3 Points)                                             [ ] Other congenital or acquired thrombophilia      (3 Points)                                                [ ] Heparin induced thrombocytopenia                  (3 Points)                                        MOBILITY RELATED FACTORS  [ ] Bed rest                                                         (1 Point)  [ ] Plaster cast                                                    (2 points)  [ ] Bed bound for more than 72 hours           (2 Points)    GENDER SPECIFIC FACTORS  [ ] Pregnancy or had a baby within the last month   (1 Point)  [ ] Post-partum < 6 weeks                                   (1 Point)  [ ] Hormonal therapy  or oral contraception   (1 Point)  [ ] History of pregnancy complications              (1 point)  [ ] Unexplained or recurrent              (1 Point)    OTHER RISK FACTORS                                           (1 Point)  [ ] BMI >40, smoking, diabetes requiring insulin, chemotherapy  blood transfusions and length of surgery over 2 hours    SURGERY RELATED RISK FACTORS  [ ]  Section within the last month     (1 Point)  [ ] Minor surgery                                                  (1 Point)  [ ] Arthroscopic surgery                                       (2 Points)  [ ] Planned major surgery lasting more            (2 Points)      than 45 minutes     [ ] Elective hip or knee joint replacement       (5 points)       surgery                                                TRAUMA RELATED RISK FACTORS  [ ] Fracture of the hip, pelvis, or leg                       (5 Points)  [ ] Spinal cord injury resulting in paralysis             (5 points)       (in the previous month)    [ ] Paralysis  (less than 1 month)                             (5 Points)  [ ] Multiple Trauma within 1 month                        (5 Points)    Total Score [        ]    Caprini Score 0-2: Low Risk, NO VTE prophylaxis required for most patients, encourage ambulation  Caprini Score 3-6: Moderate Risk , pharmacologic VTE prophylaxis is indicated for most patients (in the absence of contraindications)  Caprini Score Greater than or =7: High risk, pharmocologic VTE prophylaxis indicated for most patients (in the absence of contraindications)    OPIOID RISK TOOL    MYLENE EACH BOX THAT APPLIES AND ADD TOTALS AT THE END    FAMILY HISTORY OF SUBSTANCE ABUSE                 FEMALE         MALE                                                Alcohol                             [  ]1 pt          [  ]3pts                                               Illegal Durgs                     [  ]2 pts        [  ]3pts                                               Rx Drugs                           [  ]4 pts        [  ]4 pts    PERSONAL HISTORY OF SUBSTANCE ABUSE                                                                                          Alcohol                             [  ]3 pts       [  ]3 pts                                               Illegal Durgs                     [  ]4 pts        [  ]4 pts                                               Rx Drugs                           [  ]5 pts        [  ]5 pts    AGE BETWEEN 16-45 YEARS                                      [  ]1 pt         [  ]1 pt    HISTORY OF PREADOLESCENT   SEXUAL ABUSE                                                             [  ]3 pts        [  ]0pts    PSYCHOLOGICAL DISEASE                     ADD, OCD, Bipolar, Schizophrenia        [  ]2 pts         [  ]2 pts                      Depression                                               [  ]1 pt           [  ]1 pt           SCORING TOTAL   (add numbers and type here)              (***)                                     A score of 3 or lower indicated LOW risk for future opiod abuse  A score of 4 to 7 indicated moderate risk for future opiod abuse  A score of 8 or higher indicates a high risk for opiod abuse    Patient educated on surgical scrub,  ERP, preadmission instructions, medical clearance and day of procedure medications, verbalizes understanding.   Pt instructed to stop vitamins/supplements/herbal medications/NSAIDS for one week prior to surgery and discuss with PMD.  Patient was given information on joint class, joint book provided, ERP protocol reviewed with patient, MSSA/MRSA swabbed in PST, results pending                            CAPRINI SCORE    AGE RELATED RISK FACTORS                                                             [ ] Age 41-60 years                                            (1 Point)  [ x] Age: 61-74 years                                           (2 Points)                 [ ] Age= 75 years                                                (3 Points)             DISEASE RELATED RISK FACTORS                                                       [ ] Edema in the lower extremities                 (1 Point)                     [ ] Varicose veins                                               (1 Point)                                 [ ] BMI > 25 Kg/m2                                            (1 Point)                                  [ ] Serious infection (ie PNA)                            (1 Point)                     [ ] Lung disease ( COPD, Emphysema)            (1 Point)                                                                          [ ] Acute myocardial infarction                         (1 Point)                  [ ] Congestive heart failure (in the previous month)  (1 Point)         [ ] Inflammatory bowel disease                            (1 Point)                  [ ] Central venous access, PICC or Port               (2 points)       (within the last month)                                                                [ ] Stroke (in the previous month)                        (5 Points)    [ ] Previous or present malignancy                       (2 points)                                                                                                                                                         HEMATOLOGY RELATED FACTORS                                                         [ ] Prior episodes of VTE                                     (3 Points)                     [ ] Positive family history for VTE                      (3 Points)                  [ ] Prothrombin 16970 A                                     (3 Points)                     [ ] Factor V Leiden                                                (3 Points)                        [ ] Lupus anticoagulants                                      (3 Points)                                                           [ ] Anticardiolipin antibodies                              (3 Points)                                                       [ ] High homocysteine in the blood                   (3 Points)                                             [ ] Other congenital or acquired thrombophilia      (3 Points)                                                [ ] Heparin induced thrombocytopenia                  (3 Points)                                        MOBILITY RELATED FACTORS  [ ] Bed rest                                                         (1 Point)  [ ] Plaster cast                                                    (2 points)  [ ] Bed bound for more than 72 hours           (2 Points)    GENDER SPECIFIC FACTORS  [ ] Pregnancy or had a baby within the last month   (1 Point)  [ ] Post-partum < 6 weeks                                   (1 Point)  [ ] Hormonal therapy  or oral contraception   (1 Point)  [ x] History of pregnancy complications              (1 point)  [ ] Unexplained or recurrent              (1 Point)    OTHER RISK FACTORS                                           (1 Point)  [ ] BMI >40, smoking, diabetes requiring insulin, chemotherapy  blood transfusions and length of surgery over 2 hours    SURGERY RELATED RISK FACTORS  [ ]  Section within the last month     (1 Point)  [ ] Minor surgery                                                  (1 Point)  [ ] Arthroscopic surgery                                       (2 Points)  [ ] Planned major surgery lasting more            (2 Points)      than 45 minutes     [ ] Elective hip or knee joint replacement       (5 points)       surgery                                                TRAUMA RELATED RISK FACTORS  [ ] Fracture of the hip, pelvis, or leg                       (5 Points)  [ ] Spinal cord injury resulting in paralysis             (5 points)       (in the previous month)    [ ] Paralysis  (less than 1 month)                             (5 Points)  [ ] Multiple Trauma within 1 month                        (5 Points)    Total Score [        ]    Caprini Score 0-2: Low Risk, NO VTE prophylaxis required for most patients, encourage ambulation  Caprini Score 3-6: Moderate Risk , pharmacologic VTE prophylaxis is indicated for most patients (in the absence of contraindications)  Caprini Score Greater than or =7: High risk, pharmocologic VTE prophylaxis indicated for most patients (in the absence of contraindications)    OPIOID RISK TOOL    MYLENE EACH BOX THAT APPLIES AND ADD TOTALS AT THE END    FAMILY HISTORY OF SUBSTANCE ABUSE                 FEMALE         MALE                                                Alcohol                             [  ]1 pt          [  ]3pts                                               Illegal Durgs                     [  ]2 pts        [  ]3pts                                               Rx Drugs                           [  ]4 pts        [  ]4 pts    PERSONAL HISTORY OF SUBSTANCE ABUSE                                                                                          Alcohol                             [  ]3 pts       [  ]3 pts                                               Illegal Durgs                     [  ]4 pts        [  ]4 pts                                               Rx Drugs                           [  ]5 pts        [  ]5 pts    AGE BETWEEN 16-45 YEARS                                      [  ]1 pt         [  ]1 pt    HISTORY OF PREADOLESCENT   SEXUAL ABUSE                                                             [  ]3 pts        [  ]0pts    PSYCHOLOGICAL DISEASE                     ADD, OCD, Bipolar, Schizophrenia        [  ]2 pts         [  ]2 pts                      Depression                                               [  ]1 pt           [  ]1 pt           SCORING TOTAL   (add numbers and type here)              (***)                                     A score of 3 or lower indicated LOW risk for future opiod abuse  A score of 4 to 7 indicated moderate risk for future opiod abuse  A score of 8 or higher indicates a high risk for opiod abuse    Patient educated on surgical scrub,  ERP, preadmission instructions, medical clearance and day of procedure medications, verbalizes understanding.   Pt instructed to stop vitamins/supplements/herbal medications/NSAIDS for one week prior to surgery and discuss with PMD.  Patient was given information on joint class, joint book provided, ERP protocol reviewed with patient, MSSA/MRSA swabbed in PST, results pending                            CAPRINI SCORE    AGE RELATED RISK FACTORS                                                             [ ] Age 41-60 years                                            (1 Point)  [ x] Age: 61-74 years                                           (2 Points)                 [ ] Age= 75 years                                                (3 Points)             DISEASE RELATED RISK FACTORS                                                       [ ] Edema in the lower extremities                 (1 Point)                     [ ] Varicose veins                                               (1 Point)                                 [ x] BMI > 25 Kg/m2                                            (1 Point)                                  [ ] Serious infection (ie PNA)                            (1 Point)                     [ ] Lung disease ( COPD, Emphysema)            (1 Point)                                                                          [ ] Acute myocardial infarction                         (1 Point)                  [ ] Congestive heart failure (in the previous month)  (1 Point)         [ ] Inflammatory bowel disease                            (1 Point)                  [ ] Central venous access, PICC or Port               (2 points)       (within the last month)                                                                [ ] Stroke (in the previous month)                        (5 Points)    [ ] Previous or present malignancy                       (2 points)                                                                                                                                                         HEMATOLOGY RELATED FACTORS                                                         [ ] Prior episodes of VTE                                     (3 Points)                     [ ] Positive family history for VTE                      (3 Points)                  [ ] Prothrombin 17235 A                                     (3 Points)                     [ ] Factor V Leiden                                                (3 Points)                        [ ] Lupus anticoagulants                                      (3 Points)                                                           [ ] Anticardiolipin antibodies                              (3 Points)                                                       [ ] High homocysteine in the blood                   (3 Points)                                             [ ] Other congenital or acquired thrombophilia      (3 Points)                                                [ ] Heparin induced thrombocytopenia                  (3 Points)                                        MOBILITY RELATED FACTORS  [ ] Bed rest                                                         (1 Point)  [ ] Plaster cast                                                    (2 points)  [ ] Bed bound for more than 72 hours           (2 Points)    GENDER SPECIFIC FACTORS  [ ] Pregnancy or had a baby within the last month   (1 Point)  [ ] Post-partum < 6 weeks                                   (1 Point)  [ ] Hormonal therapy  or oral contraception   (1 Point)  [ x] History of pregnancy complications              (1 point)  [ ] Unexplained or recurrent              (1 Point)    OTHER RISK FACTORS                                           (1 Point)  [ ] BMI >40, smoking, diabetes requiring insulin, chemotherapy  blood transfusions and length of surgery over 2 hours    SURGERY RELATED RISK FACTORS  [ ]  Section within the last month     (1 Point)  [ ] Minor surgery                                                  (1 Point)  [ ] Arthroscopic surgery                                       (2 Points)  [ ] Planned major surgery lasting more            (2 Points)      than 45 minutes     [x ] Elective hip or knee joint replacement       (5 points)       surgery                                                TRAUMA RELATED RISK FACTORS  [ ] Fracture of the hip, pelvis, or leg                       (5 Points)  [ ] Spinal cord injury resulting in paralysis             (5 points)       (in the previous month)    [ ] Paralysis  (less than 1 month)                             (5 Points)  [ ] Multiple Trauma within 1 month                        (5 Points)    Total Score [   8     ]    Caprini Score 0-2: Low Risk, NO VTE prophylaxis required for most patients, encourage ambulation  Caprini Score 3-6: Moderate Risk , pharmacologic VTE prophylaxis is indicated for most patients (in the absence of contraindications)  Caprini Score Greater than or =7: High risk, pharmocologic VTE prophylaxis indicated for most patients (in the absence of contraindications)    OPIOID RISK TOOL    MYLENE EACH BOX THAT APPLIES AND ADD TOTALS AT THE END    FAMILY HISTORY OF SUBSTANCE ABUSE                 FEMALE         MALE                                                Alcohol                             [  ]1 pt          [  ]3pts                                               Illegal Durgs                     [  ]2 pts        [  ]3pts                                               Rx Drugs                           [  ]4 pts        [  ]4 pts    PERSONAL HISTORY OF SUBSTANCE ABUSE                                                                                          Alcohol                             [  ]3 pts       [  ]3 pts                                               Illegal Durgs                     [  ]4 pts        [  ]4 pts                                               Rx Drugs                           [  ]5 pts        [  ]5 pts    AGE BETWEEN 16-45 YEARS                                      [  ]1 pt         [  ]1 pt    HISTORY OF PREADOLESCENT   SEXUAL ABUSE                                                             [  ]3 pts        [  ]0pts    PSYCHOLOGICAL DISEASE                     ADD, OCD, Bipolar, Schizophrenia        [  ]2 pts         [  ]2 pts                      Depression                                               [  ]1 pt           [  ]1 pt           SCORING TOTAL  0                           A score of 3 or lower indicated LOW risk for future opiod abuse  A score of 4 to 7 indicated moderate risk for future opiod abuse  A score of 8 or higher indicates a high risk for opiod abuse    69 year old female with pmhx of stable CAD s/p NSTEMI with LAD STEPHEN placement 2019, HTN, HLD, osteoporosis taking prolia twice a year, OA.  Patient reports she had an injury 2024 putting on a boot, imaging revealed fracture and torn meniscus, pt wore boot for 8-10 weeks, tried PT however pain persisted. Today she describes pain as sharp, 7/10 in severity, worse with stairs, standing from sitting, prolonged walking, has tried injections with temporary relief (last injection 2024), motrin with some relief, has tried PT. MRI 24 revealed acute on chronic subchondral insufficiency fracture along left weightbearing medial femoral condyle, interval progression of moderate medial femorotibial and patellofemoral compartments left knee arthrosis with small volume effusion and active synovitis, unchanged chronic at least partial-thickness posterior root tear of left medial meniscus. Grade 1 subacute on grade 2 chronic sprains of overlying MCL and posteromedial corner. Focal tendinitis and grade 2 subacute on grade 2 chronic strain at the femoral origin of the medial head of the gastrocnemius. Interval worsening of now moderate left distal ITB syndrome in the proper clinical setting. Patient is scheduled for left derek total knee replacement on 24 with Dr Greene.  Patient educated on surgical scrub,  ERP, preadmission instructions, medical clearance and day of procedure medications, verbalizes understanding.   Pt instructed to stop vitamins/supplements/herbal medications/NSAIDS for one week prior to surgery and discuss with PMD.  Patient was given information on joint class, joint book provided, ERP protocol reviewed with patient, MSSA/MRSA swabbed in PST, results pending   Asked the patient to consult with PCP/cardiologist about holding ASAand the pt  agreed.

## 2024-10-25 ENCOUNTER — OUTPATIENT (OUTPATIENT)
Dept: OUTPATIENT SERVICES | Facility: HOSPITAL | Age: 69
LOS: 1 days | End: 2024-10-25
Payer: COMMERCIAL

## 2024-10-25 ENCOUNTER — APPOINTMENT (OUTPATIENT)
Dept: CT IMAGING | Facility: CLINIC | Age: 69
End: 2024-10-25
Payer: MEDICARE

## 2024-10-25 ENCOUNTER — APPOINTMENT (OUTPATIENT)
Dept: RADIOLOGY | Facility: CLINIC | Age: 69
End: 2024-10-25
Payer: MEDICARE

## 2024-10-25 DIAGNOSIS — Z90.710 ACQUIRED ABSENCE OF BOTH CERVIX AND UTERUS: Chronic | ICD-10-CM

## 2024-10-25 DIAGNOSIS — M17.12 UNILATERAL PRIMARY OSTEOARTHRITIS, LEFT KNEE: ICD-10-CM

## 2024-10-25 DIAGNOSIS — Z98.89 OTHER SPECIFIED POSTPROCEDURAL STATES: Chronic | ICD-10-CM

## 2024-10-25 PROCEDURE — 73700 CT LOWER EXTREMITY W/O DYE: CPT | Mod: 26,LT,MH

## 2024-10-25 PROCEDURE — 73564 X-RAY EXAM KNEE 4 OR MORE: CPT | Mod: 26,50

## 2024-11-08 RX ORDER — POVIDONE-IODINE 0.07 MG/ML
1 SOLUTION TOPICAL ONCE
Refills: 0 | Status: COMPLETED | OUTPATIENT
Start: 2024-11-11 | End: 2024-11-11

## 2024-11-08 NOTE — PATIENT PROFILE ADULT - FALL HARM RISK - HARM RISK INTERVENTIONS
Assistance with ambulation/Assistance OOB with selected safe patient handling equipment/Communicate Risk of Fall with Harm to all staff/Discuss with provider need for PT consult/Monitor gait and stability/Provide patient with walking aids - walker, cane, crutches/Reinforce activity limits and safety measures with patient and family/Sit up slowly, dangle for a short time, stand at bedside before walking/Tailored Fall Risk Interventions/Use of alarms - bed, chair and/or voice tab/Visual Cue: Yellow wristband and red socks/Bed in lowest position, wheels locked, appropriate side rails in place/Call bell, personal items and telephone in reach/Instruct patient to call for assistance before getting out of bed or chair/Non-slip footwear when patient is out of bed/[place] to call system/Physically safe environment - no spills, clutter or unnecessary equipment/Purposeful Proactive Rounding/Room/bathroom lighting operational, light cord in reach

## 2024-11-11 ENCOUNTER — TRANSCRIPTION ENCOUNTER (OUTPATIENT)
Age: 69
End: 2024-11-11

## 2024-11-11 ENCOUNTER — APPOINTMENT (OUTPATIENT)
Dept: ORTHOPEDIC SURGERY | Facility: HOSPITAL | Age: 69
End: 2024-11-11

## 2024-11-11 ENCOUNTER — INPATIENT (INPATIENT)
Facility: HOSPITAL | Age: 69
LOS: 0 days | Discharge: HOME HEALTH | DRG: 470 | End: 2024-11-12
Attending: ORTHOPAEDIC SURGERY | Admitting: ORTHOPAEDIC SURGERY
Payer: COMMERCIAL

## 2024-11-11 VITALS
TEMPERATURE: 97 F | DIASTOLIC BLOOD PRESSURE: 75 MMHG | SYSTOLIC BLOOD PRESSURE: 138 MMHG | OXYGEN SATURATION: 100 % | HEART RATE: 69 BPM | WEIGHT: 149.91 LBS | RESPIRATION RATE: 16 BRPM | HEIGHT: 63 IN

## 2024-11-11 DIAGNOSIS — Z98.89 OTHER SPECIFIED POSTPROCEDURAL STATES: Chronic | ICD-10-CM

## 2024-11-11 DIAGNOSIS — Z90.710 ACQUIRED ABSENCE OF BOTH CERVIX AND UTERUS: Chronic | ICD-10-CM

## 2024-11-11 DIAGNOSIS — M17.12 UNILATERAL PRIMARY OSTEOARTHRITIS, LEFT KNEE: ICD-10-CM

## 2024-11-11 LAB — ABO RH CONFIRMATION: SIGNIFICANT CHANGE UP

## 2024-11-11 PROCEDURE — 0055T BONE SRGRY CMPTR CT/MRI IMAG: CPT | Mod: LT

## 2024-11-11 PROCEDURE — 27447 TOTAL KNEE ARTHROPLASTY: CPT | Mod: AS,LT

## 2024-11-11 PROCEDURE — 73560 X-RAY EXAM OF KNEE 1 OR 2: CPT | Mod: 26,LT

## 2024-11-11 PROCEDURE — 27447 TOTAL KNEE ARTHROPLASTY: CPT | Mod: LT

## 2024-11-11 DEVICE — MAKO BONE PIN 4MM X 80MM: Type: IMPLANTABLE DEVICE | Site: LEFT | Status: FUNCTIONAL

## 2024-11-11 DEVICE — MAKO BONE PIN 4MM X 140MM: Type: IMPLANTABLE DEVICE | Site: LEFT | Status: FUNCTIONAL

## 2024-11-11 DEVICE — IMP PATELLA SYMMETRIC 9X33MM: Type: IMPLANTABLE DEVICE | Site: LEFT | Status: FUNCTIONAL

## 2024-11-11 DEVICE — COMP FEM CR CMNTLSS BEADED W/ PA SZ 4 LT: Type: IMPLANTABLE DEVICE | Site: LEFT | Status: FUNCTIONAL

## 2024-11-11 DEVICE — BASEPLATE TIB TRIATHLON TRITAN SZ 4: Type: IMPLANTABLE DEVICE | Site: LEFT | Status: FUNCTIONAL

## 2024-11-11 DEVICE — INSERT TIB BEARING CS X3 SZ 4 9MM: Type: IMPLANTABLE DEVICE | Site: LEFT | Status: FUNCTIONAL

## 2024-11-11 RX ORDER — APREPITANT 40 MG/1
40 CAPSULE ORAL ONCE
Refills: 0 | Status: COMPLETED | OUTPATIENT
Start: 2024-11-11 | End: 2024-11-11

## 2024-11-11 RX ORDER — SODIUM CHLORIDE 9 MG/ML
500 INJECTION, SOLUTION INTRAMUSCULAR; INTRAVENOUS; SUBCUTANEOUS ONCE
Refills: 0 | Status: COMPLETED | OUTPATIENT
Start: 2024-11-11 | End: 2024-11-11

## 2024-11-11 RX ORDER — KETOROLAC TROMETHAMINE 30 MG/ML
15 INJECTION INTRAMUSCULAR; INTRAVENOUS EVERY 6 HOURS
Refills: 0 | Status: DISCONTINUED | OUTPATIENT
Start: 2024-11-11 | End: 2024-11-12

## 2024-11-11 RX ORDER — LISINOPRIL 40 MG
1 TABLET ORAL
Refills: 0 | DISCHARGE

## 2024-11-11 RX ORDER — SODIUM CHLORIDE 9 MG/ML
1000 INJECTION, SOLUTION INTRAMUSCULAR; INTRAVENOUS; SUBCUTANEOUS
Refills: 0 | Status: DISCONTINUED | OUTPATIENT
Start: 2024-11-11 | End: 2024-11-12

## 2024-11-11 RX ORDER — HYDROMORPHONE HCL/0.9% NACL/PF 6 MG/30 ML
0.25 PATIENT CONTROLLED ANALGESIA SYRINGE INTRAVENOUS
Refills: 0 | Status: DISCONTINUED | OUTPATIENT
Start: 2024-11-11 | End: 2024-11-11

## 2024-11-11 RX ORDER — POLYETHYLENE GLYCOL 3350 17 G/17G
17 POWDER, FOR SOLUTION ORAL AT BEDTIME
Refills: 0 | Status: DISCONTINUED | OUTPATIENT
Start: 2024-11-11 | End: 2024-11-12

## 2024-11-11 RX ORDER — CEFAZOLIN SODIUM 1 G
2000 VIAL (EA) INJECTION
Refills: 0 | Status: COMPLETED | OUTPATIENT
Start: 2024-11-11 | End: 2024-11-11

## 2024-11-11 RX ORDER — ONDANSETRON HYDROCHLORIDE 2 MG/ML
4 INJECTION, SOLUTION INTRAMUSCULAR; INTRAVENOUS EVERY 6 HOURS
Refills: 0 | Status: DISCONTINUED | OUTPATIENT
Start: 2024-11-11 | End: 2024-11-12

## 2024-11-11 RX ORDER — ACETAMINOPHEN 500 MG
975 TABLET ORAL ONCE
Refills: 0 | Status: COMPLETED | OUTPATIENT
Start: 2024-11-11 | End: 2024-11-11

## 2024-11-11 RX ORDER — CEFAZOLIN SODIUM 1 G
2000 VIAL (EA) INJECTION ONCE
Refills: 0 | Status: DISCONTINUED | OUTPATIENT
Start: 2024-11-11 | End: 2024-11-11

## 2024-11-11 RX ORDER — ONDANSETRON HYDROCHLORIDE 2 MG/ML
4 INJECTION, SOLUTION INTRAMUSCULAR; INTRAVENOUS ONCE
Refills: 0 | Status: DISCONTINUED | OUTPATIENT
Start: 2024-11-11 | End: 2024-11-11

## 2024-11-11 RX ORDER — TRANEXAMIC ACID 650 MG/1
1000 TABLET ORAL ONCE
Refills: 0 | Status: DISCONTINUED | OUTPATIENT
Start: 2024-11-11 | End: 2024-11-11

## 2024-11-11 RX ORDER — ACETAMINOPHEN 500 MG
1000 TABLET ORAL ONCE
Refills: 0 | Status: COMPLETED | OUTPATIENT
Start: 2024-11-11 | End: 2024-11-12

## 2024-11-11 RX ORDER — HYDROMORPHONE HCL/0.9% NACL/PF 6 MG/30 ML
4 PATIENT CONTROLLED ANALGESIA SYRINGE INTRAVENOUS
Refills: 0 | Status: DISCONTINUED | OUTPATIENT
Start: 2024-11-11 | End: 2024-11-12

## 2024-11-11 RX ORDER — SENNA 187 MG
2 TABLET ORAL AT BEDTIME
Refills: 0 | Status: DISCONTINUED | OUTPATIENT
Start: 2024-11-11 | End: 2024-11-12

## 2024-11-11 RX ORDER — FENTANYL CITRAT/DEXTROSE 5%/PF 1250MCG/50
25 PATIENT CONTROLLED ANALGESIA SYRINGE INTRAVENOUS
Refills: 0 | Status: DISCONTINUED | OUTPATIENT
Start: 2024-11-11 | End: 2024-11-11

## 2024-11-11 RX ORDER — CELECOXIB 100 MG
200 CAPSULE ORAL EVERY 12 HOURS
Refills: 0 | Status: DISCONTINUED | OUTPATIENT
Start: 2024-11-13 | End: 2024-11-12

## 2024-11-11 RX ORDER — OXYCODONE HYDROCHLORIDE 30 MG/1
5 TABLET ORAL
Refills: 0 | Status: DISCONTINUED | OUTPATIENT
Start: 2024-11-11 | End: 2024-11-12

## 2024-11-11 RX ORDER — PANTOPRAZOLE SODIUM 40 MG/1
40 TABLET, DELAYED RELEASE ORAL
Refills: 0 | Status: DISCONTINUED | OUTPATIENT
Start: 2024-11-11 | End: 2024-11-12

## 2024-11-11 RX ORDER — LISINOPRIL 40 MG
10 TABLET ORAL DAILY
Refills: 0 | Status: DISCONTINUED | OUTPATIENT
Start: 2024-11-13 | End: 2024-11-12

## 2024-11-11 RX ORDER — OXYCODONE HYDROCHLORIDE 30 MG/1
10 TABLET ORAL
Refills: 0 | Status: DISCONTINUED | OUTPATIENT
Start: 2024-11-11 | End: 2024-11-12

## 2024-11-11 RX ORDER — MAGNESIUM, ALUMINUM HYDROXIDE 200-200 MG
30 TABLET,CHEWABLE ORAL
Refills: 0 | Status: DISCONTINUED | OUTPATIENT
Start: 2024-11-11 | End: 2024-11-12

## 2024-11-11 RX ORDER — ACETAMINOPHEN 500 MG
975 TABLET ORAL EVERY 8 HOURS
Refills: 0 | Status: DISCONTINUED | OUTPATIENT
Start: 2024-11-11 | End: 2024-11-12

## 2024-11-11 RX ORDER — SODIUM CHLORIDE 9 MG/ML
3 INJECTION, SOLUTION INTRAMUSCULAR; INTRAVENOUS; SUBCUTANEOUS EVERY 8 HOURS
Refills: 0 | Status: DISCONTINUED | OUTPATIENT
Start: 2024-11-11 | End: 2024-11-11

## 2024-11-11 RX ORDER — ASPIRIN/MAG CARB/ALUMINUM AMIN 325 MG
81 TABLET ORAL
Refills: 0 | Status: DISCONTINUED | OUTPATIENT
Start: 2024-11-12 | End: 2024-11-12

## 2024-11-11 RX ADMIN — POVIDONE-IODINE 1 APPLICATION(S): 0.07 SOLUTION TOPICAL at 06:24

## 2024-11-11 RX ADMIN — Medication 2000 MILLIGRAM(S): at 23:27

## 2024-11-11 RX ADMIN — Medication 975 MILLIGRAM(S): at 22:40

## 2024-11-11 RX ADMIN — Medication 975 MILLIGRAM(S): at 06:18

## 2024-11-11 RX ADMIN — Medication 975 MILLIGRAM(S): at 13:33

## 2024-11-11 RX ADMIN — POLYETHYLENE GLYCOL 3350 17 GRAM(S): 17 POWDER, FOR SOLUTION ORAL at 21:50

## 2024-11-11 RX ADMIN — SODIUM CHLORIDE 75 MILLILITER(S): 9 INJECTION, SOLUTION INTRAMUSCULAR; INTRAVENOUS; SUBCUTANEOUS at 13:34

## 2024-11-11 RX ADMIN — Medication 975 MILLIGRAM(S): at 14:43

## 2024-11-11 RX ADMIN — Medication 975 MILLIGRAM(S): at 21:49

## 2024-11-11 RX ADMIN — Medication 2 TABLET(S): at 21:49

## 2024-11-11 RX ADMIN — KETOROLAC TROMETHAMINE 15 MILLIGRAM(S): 30 INJECTION INTRAMUSCULAR; INTRAVENOUS at 19:41

## 2024-11-11 RX ADMIN — APREPITANT 40 MILLIGRAM(S): 40 CAPSULE ORAL at 06:17

## 2024-11-11 RX ADMIN — Medication 40 MILLIGRAM(S): at 21:49

## 2024-11-11 RX ADMIN — KETOROLAC TROMETHAMINE 15 MILLIGRAM(S): 30 INJECTION INTRAMUSCULAR; INTRAVENOUS at 14:00

## 2024-11-11 RX ADMIN — KETOROLAC TROMETHAMINE 15 MILLIGRAM(S): 30 INJECTION INTRAMUSCULAR; INTRAVENOUS at 18:41

## 2024-11-11 RX ADMIN — SODIUM CHLORIDE 500 MILLILITER(S): 9 INJECTION, SOLUTION INTRAMUSCULAR; INTRAVENOUS; SUBCUTANEOUS at 10:48

## 2024-11-11 RX ADMIN — Medication 2000 MILLIGRAM(S): at 14:44

## 2024-11-11 RX ADMIN — KETOROLAC TROMETHAMINE 15 MILLIGRAM(S): 30 INJECTION INTRAMUSCULAR; INTRAVENOUS at 23:27

## 2024-11-11 RX ADMIN — KETOROLAC TROMETHAMINE 15 MILLIGRAM(S): 30 INJECTION INTRAMUSCULAR; INTRAVENOUS at 13:33

## 2024-11-11 NOTE — PHYSICAL THERAPY INITIAL EVALUATION ADULT - MANUAL MUSCLE TESTING RESULTS, REHAB EVAL
LLE: ankle dorsiflexors 4/5 within available ROM, knee extensors 0/5. RLE: ankle dorsiflexors 5/5, knee extensors 5/5, hip flexors at least 3/5.

## 2024-11-11 NOTE — PHYSICAL THERAPY INITIAL EVALUATION ADULT - RANGE OF MOTION EXAMINATION, REHAB EVAL
LLE: PROM knee extension WFL, knee flexion to approximately 55 degrees, ankle dorsiflexion AROM to approximately -4 degrees from neutral dorsiflexion, ankle plantarflexion WFL, hip flexion WFL to sit at edge of bed.  Note: pt reports chronic decreased left ankle dorsiflexion ROM./bilateral upper extremity ROM was WFL (within functional limits)/Right LE ROM was WFL (within functional limits)

## 2024-11-11 NOTE — PHYSICAL THERAPY INITIAL EVALUATION ADULT - PLANNED THERAPY INTERVENTIONS, PT EVAL
stair training: pt will participate in stair assessment with PT./bed mobility training/gait training/transfer training

## 2024-11-11 NOTE — PHYSICAL THERAPY INITIAL EVALUATION ADULT - DIAGNOSIS, PT EVAL
Pt demonstrates functional limitations in bed mobility and is unable to participate in OOB mobility due to weakness.

## 2024-11-11 NOTE — DISCHARGE NOTE PROVIDER - HOSPITAL COURSE
The patient underwent a LEFT TOTAL KNEE REPLACEMENT on 11/11/24 with Dr. Greene. The patient received antibiotics consistent with SCIP guidelines. The patient underwent the procedure and had no intra-operative complications. Post-operatively, the patient was seen by medicine and PT. The patient received ASPIRIN for DVTP. The patient received pain medications per orthopedic pain management pathway and the pain was appropriately controlled. The patient did not have any post-operative medical complications. The patient was discharged in stable condition.

## 2024-11-11 NOTE — DISCHARGE NOTE PROVIDER - CARE PROVIDER_API CALL
Zaki Greene  Joint Reconstruction  [address], [address] [place] [address]  Phone: [phone]  Fax: [phone]  Follow Up Time:

## 2024-11-11 NOTE — DISCHARGE NOTE PROVIDER - NSDCMRMEDTOKEN_GEN_ALL_CORE_FT
aspirin 81 mg oral delayed release tablet: 1 tab(s) orally once a day  atorvastatin 40 mg oral tablet: 1 tab(s) orally once a day  lisinopril 10 mg oral tablet: 1 tab(s) orally once a day  mupirocin 2% topical ointment: 1 application in each affected nostril 2 times a day start 10/26 use twice daily for 5 days   acetaminophen 325 mg oral tablet: 3 tab(s) orally every 8 hours cont x 1 week then as needed MDD: 9  aspirin 81 mg oral delayed release tablet: 1 tab(s) orally once a day RESTART 12/24/23  Aspirin EC 81 mg oral delayed release tablet: 1 tab(s) orally 2 times a day Continue for 6 weeks postop - last dose on 12/23/24 MDD: 2  atorvastatin 40 mg oral tablet: 1 tab(s) orally once a day  CeleBREX 200 mg oral capsule: 1 cap(s) orally 2 times a day continue for 3 weeks postop - begin 11/13 - last dose on 12/3/24 MDD: 2  lisinopril 10 mg oral tablet: 1 tab(s) orally once a day  Narcan 4 mg/0.1 mL nasal spray: 4 milligram(s) intranasally once as needed for over-sedation MDD: 1  omeprazole 20 mg oral delayed release capsule: 1 cap(s) orally once a day continue x 6 weeks postop - last dose on 12/23/24  oxyCODONE 5 mg oral tablet: 1 tab(s) orally every 4 to 6 hours as needed for  severe pain MDD: 4  Senna S 50 mg-8.6 mg oral tablet: 2 tab(s) orally once a day (at bedtime) as needed for  constipation MDD: 2

## 2024-11-11 NOTE — PHYSICAL THERAPY INITIAL EVALUATION ADULT - LIGHT TOUCH SENSATION, LLE, REHAB EVAL
absent sensation to 5th toe, and lateral malleolus.  Light touch intact to great toe, plantar surface of foot, and medial malleolus. Pt reports chronic sensory deficits to toes./absent sensation

## 2024-11-11 NOTE — DISCHARGE NOTE PROVIDER - NSDCFUADDINST_GEN_ALL_CORE_FT
The patient will be seen in the office between 2-3 weeks for wound check.   **Your first post-operative visit has been scheduled prior to your admission. PLEASE CONTACT OFFICE TO CONFIRM THE APPOINTMENT DATE. Sutures/Staples/Tape will be removed at that time.  **  The silver based dressing is to be removed 7 days from the date of surgery.   ** CONTACT THE OFFICE IF THE FOLLOWING DEVELOP:  - the dressing becomes soiled or saturated  - you develop a fever greater that 101F  - the wound becomes red or you develop blistering around the wound  * Patient may shower after post-op day #3.   * The patient will continue home PT consistent with  total knee replacement protocol. Transition to outpatient PT will occur at the time of the first office visit.   * The patient will practice knee extension exercises regularly to minimize hamstring contraction.   * The patient is FULL weight bearing.  * The patient will continue ASPIRIN for 6 weeks after surgery for blood clot prevention.  * While on aspirin, the patient will take daily omeprazole or other similar medication to protect the stomach from irritation.   * The patient will take OXYCODONE for pain control and adjust according to prescription and patient needs. The patient will also take TYLENOL 975mg every 8 hours for pain control. Contact the office if pain increases while taking prescribed pain medications or related concerns develop.  * Celebrex will be taken twice daily for 3 weeks for pain control and prevention of excessive bone growth. Additional prescription may be requested at your office follow-up visit.   * The patient will take Senna S while taking oxycodone to prevent narcotic associated constipation.  Additionally, increase water intake (drink at least 8 glasses of water daily) and try adding fiber to the diet by eating fruits, vegetables and foods that are rich in grains. If constipation is experienced, contact the medical/primary care provider to discuss further treatment options.  * To avoid injury at home:  - continue use of rolling walker until cleared by physical therapist  - have family or friend remove all throw rug or objects in hallways that may present a trip hazard.  - if you experience any dizziness or medical concerns, call your medical doctor or  911.  * The implant may activate metal detection devices.

## 2024-11-11 NOTE — PHYSICAL THERAPY INITIAL EVALUATION ADULT - ADDITIONAL COMMENTS
Pt reports she lives with her  and daughter in a private home with 1 JIMI with 0 HR, and 0 interior stairs.   Pt reports that prior to surgery she was I in all ADLs and ambulation without use of assistive devices.    DME: pt owns a RW, cane, and has comfort height toilets.

## 2024-11-11 NOTE — PHYSICAL THERAPY INITIAL EVALUATION ADULT - PERTINENT HX OF CURRENT PROBLEM, REHAB EVAL
Dx: pt is s/p left knee TKA  PMH: CAD s/p NSTEMI with CAD STEPHEN 12/2019, lumbar spondylosis, HTN, HLD, osteoporosis, OA

## 2024-11-11 NOTE — PHYSICAL THERAPY INITIAL EVALUATION ADULT - LEVEL OF INDEPENDENCE: SIT/STAND, REHAB EVAL
unsafe to attempt OOB mobility at this time due to left knee extensors 0/5 MMT, making pt a high fall risk./unable to perform

## 2024-11-11 NOTE — DISCHARGE NOTE NURSING/CASE MANAGEMENT/SOCIAL WORK - PATIENT PORTAL LINK [facility]
You can access the FollowMyHealth Patient Portal offered by [facility] by registering at the following website: [email or web address]. By joining [facility]’s FollowMyHealth portal, you will also be able to view your health information using other applications (apps) compatible with our system.

## 2024-11-11 NOTE — DISCHARGE NOTE NURSING/CASE MANAGEMENT/SOCIAL WORK - FINANCIAL ASSISTANCE
[facility] provides services at a reduced cost to those who are determined to be eligible through [facility]’s financial assistance program. Information regarding [facility]’s financial assistance program can be found by going to [email or web address] or by calling [phone].

## 2024-11-11 NOTE — DISCHARGE NOTE PROVIDER - NSDCFUSCHEDAPPT_GEN_ALL_CORE_FT
Zaki Greene  [facility]  ORTHOSURG [address]  Scheduled Appointment: 12/03/2024    [facility]  ORTHOSURG [address]  Scheduled Appointment: 12/27/2024

## 2024-11-12 VITALS
HEART RATE: 65 BPM | OXYGEN SATURATION: 99 % | TEMPERATURE: 98 F | SYSTOLIC BLOOD PRESSURE: 101 MMHG | RESPIRATION RATE: 18 BRPM | DIASTOLIC BLOOD PRESSURE: 56 MMHG

## 2024-11-12 LAB
ANION GAP SERPL CALC-SCNC: 9 MMOL/L — SIGNIFICANT CHANGE UP (ref 5–17)
BUN SERPL-MCNC: 11.6 MG/DL — SIGNIFICANT CHANGE UP (ref 8–20)
CALCIUM SERPL-MCNC: 8 MG/DL — LOW (ref 8.4–10.5)
CHLORIDE SERPL-SCNC: 111 MMOL/L — HIGH (ref 96–108)
CO2 SERPL-SCNC: 21 MMOL/L — LOW (ref 22–29)
CREAT SERPL-MCNC: 0.45 MG/DL — LOW (ref 0.5–1.3)
EGFR: 105 ML/MIN/1.73M2 — SIGNIFICANT CHANGE UP
GLUCOSE SERPL-MCNC: 117 MG/DL — HIGH (ref 70–99)
HCT VFR BLD CALC: 31.5 % — LOW (ref 34.5–45)
HGB BLD-MCNC: 11 G/DL — LOW (ref 11.5–15.5)
MCHC RBC-ENTMCNC: 31.5 PG — SIGNIFICANT CHANGE UP (ref 27–34)
MCHC RBC-ENTMCNC: 34.9 G/DL — SIGNIFICANT CHANGE UP (ref 32–36)
MCV RBC AUTO: 90.3 FL — SIGNIFICANT CHANGE UP (ref 80–100)
PLATELET # BLD AUTO: 214 K/UL — SIGNIFICANT CHANGE UP (ref 150–400)
POTASSIUM SERPL-MCNC: 4.2 MMOL/L — SIGNIFICANT CHANGE UP (ref 3.5–5.3)
POTASSIUM SERPL-SCNC: 4.2 MMOL/L — SIGNIFICANT CHANGE UP (ref 3.5–5.3)
RBC # BLD: 3.49 M/UL — LOW (ref 3.8–5.2)
RBC # FLD: 12.4 % — SIGNIFICANT CHANGE UP (ref 10.3–14.5)
SODIUM SERPL-SCNC: 141 MMOL/L — SIGNIFICANT CHANGE UP (ref 135–145)
WBC # BLD: 11.71 K/UL — HIGH (ref 3.8–10.5)
WBC # FLD AUTO: 11.71 K/UL — HIGH (ref 3.8–10.5)

## 2024-11-12 PROCEDURE — 99222 1ST HOSP IP/OBS MODERATE 55: CPT

## 2024-11-12 RX ORDER — ACETAMINOPHEN 500 MG
3 TABLET ORAL
Qty: 90 | Refills: 0
Start: 2024-11-12

## 2024-11-12 RX ORDER — CELECOXIB 100 MG
1 CAPSULE ORAL
Qty: 42 | Refills: 0
Start: 2024-11-12 | End: 2024-12-02

## 2024-11-12 RX ORDER — OMEPRAZOLE 10 MG
1 CAPSULE,DELAYED RELEASE (ENTERIC COATED) ORAL
Qty: 42 | Refills: 0
Start: 2024-11-12 | End: 2024-12-23

## 2024-11-12 RX ORDER — SENNOSIDES AND DOCUSATE SODIUM 8.6; 5 MG/1; MG/1
2 TABLET ORAL
Qty: 20 | Refills: 0
Start: 2024-11-12

## 2024-11-12 RX ORDER — NALOXONE HYDROCHLORIDE 1 MG/ML
4 INJECTION, SOLUTION INTRAMUSCULAR; INTRAVENOUS; SUBCUTANEOUS
Qty: 1 | Refills: 0
Start: 2024-11-12

## 2024-11-12 RX ORDER — OXYCODONE HYDROCHLORIDE 30 MG/1
1 TABLET ORAL
Qty: 28 | Refills: 0
Start: 2024-11-12

## 2024-11-12 RX ORDER — ASPIRIN/MAG CARB/ALUMINUM AMIN 325 MG
1 TABLET ORAL
Qty: 84 | Refills: 0
Start: 2024-11-12 | End: 2024-12-23

## 2024-11-12 RX ADMIN — Medication 81 MILLIGRAM(S): at 05:00

## 2024-11-12 RX ADMIN — PANTOPRAZOLE SODIUM 40 MILLIGRAM(S): 40 TABLET, DELAYED RELEASE ORAL at 05:01

## 2024-11-12 RX ADMIN — KETOROLAC TROMETHAMINE 15 MILLIGRAM(S): 30 INJECTION INTRAMUSCULAR; INTRAVENOUS at 05:44

## 2024-11-12 RX ADMIN — Medication 1000 MILLIGRAM(S): at 05:43

## 2024-11-12 RX ADMIN — Medication 400 MILLIGRAM(S): at 05:00

## 2024-11-12 RX ADMIN — KETOROLAC TROMETHAMINE 15 MILLIGRAM(S): 30 INJECTION INTRAMUSCULAR; INTRAVENOUS at 00:25

## 2024-11-12 RX ADMIN — KETOROLAC TROMETHAMINE 15 MILLIGRAM(S): 30 INJECTION INTRAMUSCULAR; INTRAVENOUS at 05:01

## 2024-11-12 NOTE — CONSULT NOTE ADULT - SUBJECTIVE AND OBJECTIVE BOX
Patient is a 69y old  Female who is s/p L TKA , POD#1. Pain well controlled , denies n/v , voiding , participating with   physical therapy .     CC: L knee chronic pain postop well controlled       HPI:  69 year old female with pmhx of stable CAD s/p NSTEMI with LAD STEPHEN placement 2019, lumbar spondylosis, HTN, HLD, osteoporosis taking prolia twice a year, OA.  Patient reports she had an injury 2024 putting on a boot, imaging revealed fracture and torn meniscus, pt wore boot for 8-10 weeks, tried PT however pain persisted. Today she describes pain as sharp, 7/10 in severity, worse with stairs, standing from sitting, prolonged walking, has tried injections with temporary relief (last injection 2024), motrin with some relief, has tried PT.     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      Thyroid nodule      Unilateral primary osteoarthritis, left knee      H/O abdominal hysterectomy      History of cholecystectomy      History of lumpectomy  bilateral      History of Nissen fundoplication          Social History:  Tobacco - denies   ETOH - occasionally   Illicit drug abuse - denies    FAMILY HISTORY:  Family history of hypertension    Family history of atrial fibrillation    Family history of acute myocardial infarction (Sibling)  Father age 50s (MI)  Brother age 47 ()    Family history of brain tumor        Allergies    No Known Drug Allergies  latex (Hives)    Intolerances    HOME MEDICATIONS :     aspirin 81 mg oral delayed release tablet: 1 tab(s) orally once a day RESTART 23 (2024 07:28)  atorvastatin 40 mg oral tablet: 1 tab(s) orally once a day (2024 06:44)  lisinopril 10 mg oral tablet: 1 tab(s) orally once a day (2024 06:44)      REVIEW OF SYSTEMS:    As above , all other systems are reviewed and are negative .    MEDICATIONS  (STANDING):  acetaminophen     Tablet .. 975 milliGRAM(s) Oral every 8 hours  aspirin enteric coated 81 milliGRAM(s) Oral two times a day  atorvastatin 40 milliGRAM(s) Oral at bedtime  pantoprazole    Tablet 40 milliGRAM(s) Oral before breakfast  polyethylene glycol 3350 17 Gram(s) Oral at bedtime  senna 2 Tablet(s) Oral at bedtime  sodium chloride 0.9%. 1000 milliLiter(s) (75 mL/Hr) IV Continuous <Continuous>    MEDICATIONS  (PRN):  aluminum hydroxide/magnesium hydroxide/simethicone Suspension 30 milliLiter(s) Oral four times a day PRN Indigestion  HYDROmorphone   Tablet 4 milliGRAM(s) Oral every 3 hours PRN Severe Pain (7 - 10)  ondansetron Injectable 4 milliGRAM(s) IV Push every 6 hours PRN Nausea and/or Vomiting  oxyCODONE    IR 5 milliGRAM(s) Oral every 3 hours PRN Mild Pain (1 - 3)  oxyCODONE    IR 10 milliGRAM(s) Oral every 3 hours PRN Moderate Pain (4 - 6)      Vital Signs Last 24 Hrs  T(C): 36.7 (2024 08:52), Max: 36.8 (2024 20:05)  T(F): 98.1 (2024 08:52), Max: 98.2 (2024 20:05)  HR: 65 (2024 08:52) (56 - 80)  BP: 101/56 (2024 08:52) (90/50 - 147/79)  BP(mean): 80 (2024 11:15) (62 - 80)  RR: 18 (2024 08:52) (13 - 18)  SpO2: 99% (2024 08:52) (96% - 100%)    Parameters below as of 2024 08:52  Patient On (Oxygen Delivery Method): room air        I&O's Detail    2024 07:01  -  2024 07:00  --------------------------------------------------------  IN:    IV PiggyBack: 100 mL    Oral Fluid: 1020 mL    sodium chloride 0.9%: 1425 mL    Sodium Chloride 0.9% Bolus: 500 mL  Total IN: 3045 mL    OUT:    Voided (mL): 500 mL  Total OUT: 500 mL    Total NET: 2545 mL        PHYSICAL EXAM:    GENERAL: NAD, well-groomed, well-developed  HEAD:  Atraumatic, Normocephalic  EYES: EOMI, PERRLA, conjunctiva and sclera clear  NECK: Supple, No JVD, Normal thyroid  NERVOUS SYSTEM:  Alert & Oriented X3, no focal deficit  CHEST/LUNG: CTA  b/l,  no rales, rhonchi, wheezing, or rubs  HEART: Regular rate and rhythm; No murmurs, rubs, or gallops  ABDOMEN: Soft, Nontender, Nondistended; Bowel sounds present  EXTREMITIES:  2+ Peripheral Pulses, No clubbing, cyanosis, or edema ,   LYMPH: No lymphadenopathy noted  SKIN: No rashes or lesions    LABS:                        11.0   11.71 )-----------( 214      ( 2024 04:23 )             31.5     1112    141  |  111[H]  |  11.6  ----------------------------<  117[H]  4.2   |  21.0[L]  |  0.45[L]    Ca    8.0[L]      2024 04:23      RADIOLOGY & ADDITIONAL STUDIES:  < from: Xray Knee 1 or 2 Views, Left (.24 @ 10:56) >    ACC: [id number] EXAM:  XR KNEE 1-2 VIEWS LT   ORDERED BY: SUKUMAR MONTEZ     PROCEDURE DATE:  2024          INTERPRETATION:  Left knee.    Postoperative AP and lateral views show a knee replacement with good   alignment. No bone destruction or fracture.    IMPRESSION: Left knee replacement.    --- End of Report ---

## 2024-11-12 NOTE — PROGRESS NOTE ADULT - SUBJECTIVE AND OBJECTIVE BOX
Orthopedic PA Postop Note  Patient S/P Left TKA  Patient in bed comfortable   LEFT Leg  Dressing C/D/I   Pulse intact   Calf Soft NT  Dorsi/Plantar Flexion intact     A/P S/P LEFT TKA  1. DVTP - ASA  2. PT   3. Pain Control  
GRACIELA CABRERA  [id number]    History: Patient seen and eval at bedside. Patient is doing well and is comfortable but notes she just started to get feeling back in her leg this morning around 2 am and she experienced buckling of the left knee when getting the first time but not the second time. The patient's pain is controlled using the prescribed pain medications. The patient is participating in physical therapy. Denies nausea, vomiting, chest pain, shortness of breath, abdominal pain or fever. No new complaints.    T(C): 36.5 (11-12-24 @ 05:07), Max: 36.8 (11-11-24 @ 20:05)  HR: 63 (11-12-24 @ 05:07) (56 - 80)  BP: 113/59 (11-12-24 @ 05:07) (90/50 - 147/79)  RR: 18 (11-12-24 @ 05:07) (13 - 18)  SpO2: 96% (11-12-24 @ 05:07) (96% - 100%)                          11.0   11.71 )-----------( 214      ( 12 Nov 2024 04:23 )             31.5     11-12    141  |  111[H]  |  11.6  ----------------------------<  117[H]  4.2   |  21.0[L]  |  0.45[L]      PE: NAD, alert, awake  Left LE:   Knee dressing C/D/I, no drainage, no bleeding  EHL/TA/FHL/GS intact, DP pulse 2+  Gross sensation to LT intact distally s/s/DP/SP/tib distrib, Calf soft, NT B/L    Primary Orthopedic Assessment:  •s/p LEFT total knee replacement POD#1    Plan:   DVT prophylaxis as prescribed - ASA, including use of compression devices and ankle pumps  Continue physical therapy: Weightbearing as tolerated of the left lower extremity with assistance of a walker  Incentive spirometry encouraged  Pain control as clinically indicated  Discharge planning: home today pending PT and med clearance

## 2024-11-12 NOTE — CONSULT NOTE ADULT - PROVIDER SPECIALTY LIST ADULT
Subjective:      Patient ID: Brook Burdick is a 42 y.o. female.    Vitals:  height is 6' (1.829 m) and weight is 155.7 kg (343 lb 4.1 oz) (abnormal). Her temperature is 98.2 °F (36.8 °C). Her blood pressure is 148/81 (abnormal) and her pulse is 74. Her respiration is 16 and oxygen saturation is 96%.     Chief Complaint: Otalgia    Pt states pain has been going on for a couple of years. Pt states both ears feel like they are swelling, itching and have been experiencing pain. Pt has been given rx ointment and rx drops with no relief a few years ago.     Otalgia   There is pain in both ears. This is a chronic problem. The current episode started more than 1 year ago. The problem occurs constantly. The problem has been unchanged. There has been no fever. The pain is at a severity of 7/10. The pain is moderate. She has tried ear drops for the symptoms. The treatment provided no relief. Her past medical history is significant for a chronic ear infection.     HENT:  Positive for ear pain.     Objective:     Physical Exam   Constitutional: She does not appear ill. No distress.   HENT:   Head: Normocephalic.   Ears:   Right Ear: Tympanic membrane normal.   Left Ear: Tympanic membrane normal.      Comments: Dry skin in bilateral ear canals  Nose: Nose normal. No congestion.   Eyes: Conjunctivae are normal. Pupils are equal, round, and reactive to light.   Cardiovascular: Normal rate and regular rhythm.   Pulmonary/Chest: Effort normal and breath sounds normal. No respiratory distress. She has no wheezes.   Abdominal: Normal appearance.   Neurological: She is alert.   Nursing note and vitals reviewed.    Assessment:     No diagnosis found.    Plan:       There are no diagnoses linked to this encounter.                   Hospitalist

## 2024-11-12 NOTE — CONSULT NOTE ADULT - ASSESSMENT
69 year old female with pmhx of stable CAD s/p NSTEMI with LAD STEPHEN placement 12/2019, lumbar spondylosis, HTN, HLD, osteoporosis taking prolia twice a year, OA.  Patient reports she had an injury January 2024 putting on a boot, imaging revealed fracture and torn meniscus, pt wore boot for 8-10 weeks, tried PT however pain persisted. Today she describes pain as sharp, 7/10 in severity, worse with stairs, standing from sitting, prolonged walking, has tried injections with temporary relief (last injection July 2024), motrin with some relief, has tried PT.     1. L knee OA , S/P L TKA , POD#1,   PT/OT/pain mgmt  DVT prophylaxis- as per ortho  Abx as per SCIP- given  Incentive spirometry  Prophylaxis of opioid  induced constipation.  Wound care as per ortho   EBL - 5 ml - minimal     2. HTN - restart losartan POD#2 if BP allows    3. HLD- continue statin     4. DVT prophylaxis  - as per ortho protocol  Opioid induced constipation  prophylaxis - bowel regimen GI prophylaxis    Thank yopu for the courtesy of this consult ,   will follow along with you.    Medically stable to d/c once cleared by physical therapy/ ortho .

## 2024-11-13 NOTE — PROVIDER CONTACT NOTE (OTHER) - SITUATION
Attended joint education session on 11/6/2024 via online method with opportunity to ask questions. Contact information for follow up questions given.

## 2024-11-20 PROCEDURE — 87641 MR-STAPH DNA AMP PROBE: CPT

## 2024-11-20 PROCEDURE — 73564 X-RAY EXAM KNEE 4 OR MORE: CPT

## 2024-11-20 PROCEDURE — 86901 BLOOD TYPING SEROLOGIC RH(D): CPT

## 2024-11-20 PROCEDURE — 80048 BASIC METABOLIC PNL TOTAL CA: CPT

## 2024-11-20 PROCEDURE — 73700 CT LOWER EXTREMITY W/O DYE: CPT

## 2024-11-20 PROCEDURE — 86900 BLOOD TYPING SEROLOGIC ABO: CPT

## 2024-11-20 PROCEDURE — 36415 COLL VENOUS BLD VENIPUNCTURE: CPT

## 2024-11-20 PROCEDURE — 85610 PROTHROMBIN TIME: CPT

## 2024-11-20 PROCEDURE — 87640 STAPH A DNA AMP PROBE: CPT

## 2024-11-20 PROCEDURE — 86850 RBC ANTIBODY SCREEN: CPT

## 2024-11-20 PROCEDURE — G0463: CPT

## 2024-11-20 PROCEDURE — 85730 THROMBOPLASTIN TIME PARTIAL: CPT

## 2024-11-20 PROCEDURE — 83036 HEMOGLOBIN GLYCOSYLATED A1C: CPT

## 2024-11-20 PROCEDURE — 85025 COMPLETE CBC W/AUTO DIFF WBC: CPT

## 2024-11-21 ENCOUNTER — NON-APPOINTMENT (OUTPATIENT)
Age: 69
End: 2024-11-21

## 2024-11-21 RX ORDER — OXYCODONE 5 MG/1
5 TABLET ORAL
Qty: 42 | Refills: 0 | Status: ACTIVE | COMMUNITY
Start: 2024-11-21 | End: 1900-01-01

## 2024-11-26 PROBLEM — Z96.652 STATUS POST LEFT KNEE REPLACEMENT: Status: ACTIVE | Noted: 2024-11-26

## 2024-11-26 PROCEDURE — C1776: CPT

## 2024-11-26 PROCEDURE — 97116 GAIT TRAINING THERAPY: CPT

## 2024-11-26 PROCEDURE — 97110 THERAPEUTIC EXERCISES: CPT

## 2024-11-26 PROCEDURE — S2900: CPT

## 2024-11-26 PROCEDURE — 97163 PT EVAL HIGH COMPLEX 45 MIN: CPT

## 2024-11-26 PROCEDURE — 27447 TOTAL KNEE ARTHROPLASTY: CPT

## 2024-11-26 PROCEDURE — C1713: CPT

## 2024-11-26 PROCEDURE — 85027 COMPLETE CBC AUTOMATED: CPT

## 2024-11-26 PROCEDURE — 73560 X-RAY EXAM OF KNEE 1 OR 2: CPT

## 2024-11-26 PROCEDURE — 80048 BASIC METABOLIC PNL TOTAL CA: CPT

## 2024-11-26 PROCEDURE — 36415 COLL VENOUS BLD VENIPUNCTURE: CPT

## 2024-12-03 ENCOUNTER — APPOINTMENT (OUTPATIENT)
Dept: ORTHOPEDIC SURGERY | Facility: CLINIC | Age: 69
End: 2024-12-03
Payer: MEDICARE

## 2024-12-03 VITALS — WEIGHT: 164 LBS | BODY MASS INDEX: 30.18 KG/M2 | HEIGHT: 62 IN

## 2024-12-03 DIAGNOSIS — Z47.1 AFTERCARE FOLLOWING JOINT REPLACEMENT SURGERY: ICD-10-CM

## 2024-12-03 DIAGNOSIS — Z96.652 PRESENCE OF LEFT ARTIFICIAL KNEE JOINT: ICD-10-CM

## 2024-12-03 DIAGNOSIS — Z96.652 AFTERCARE FOLLOWING JOINT REPLACEMENT SURGERY: ICD-10-CM

## 2024-12-03 PROCEDURE — 99213 OFFICE O/P EST LOW 20 MIN: CPT | Mod: 24

## 2024-12-03 PROCEDURE — 73562 X-RAY EXAM OF KNEE 3: CPT | Mod: LT

## 2024-12-30 ENCOUNTER — NON-APPOINTMENT (OUTPATIENT)
Age: 69
End: 2024-12-30

## 2024-12-31 ENCOUNTER — APPOINTMENT (OUTPATIENT)
Dept: ORTHOPEDIC SURGERY | Facility: CLINIC | Age: 69
End: 2024-12-31
Payer: MEDICARE

## 2024-12-31 VITALS
WEIGHT: 154 LBS | DIASTOLIC BLOOD PRESSURE: 67 MMHG | SYSTOLIC BLOOD PRESSURE: 136 MMHG | HEART RATE: 97 BPM | BODY MASS INDEX: 28.17 KG/M2 | OXYGEN SATURATION: 96 %

## 2024-12-31 DIAGNOSIS — Z47.1 AFTERCARE FOLLOWING JOINT REPLACEMENT SURGERY: ICD-10-CM

## 2024-12-31 DIAGNOSIS — Z96.652 PRESENCE OF LEFT ARTIFICIAL KNEE JOINT: ICD-10-CM

## 2024-12-31 PROCEDURE — 99024 POSTOP FOLLOW-UP VISIT: CPT

## 2024-12-31 PROCEDURE — 73562 X-RAY EXAM OF KNEE 3: CPT | Mod: 26,LT

## 2024-12-31 RX ORDER — ONDANSETRON 4 MG/1
4 TABLET ORAL
Qty: 14 | Refills: 0 | Status: ACTIVE | COMMUNITY
Start: 2024-12-31 | End: 1900-01-01

## 2025-02-11 ENCOUNTER — APPOINTMENT (OUTPATIENT)
Dept: ORTHOPEDIC SURGERY | Facility: CLINIC | Age: 70
End: 2025-02-11
Payer: MEDICARE

## 2025-02-11 VITALS
SYSTOLIC BLOOD PRESSURE: 124 MMHG | HEART RATE: 65 BPM | BODY MASS INDEX: 28.34 KG/M2 | DIASTOLIC BLOOD PRESSURE: 76 MMHG | HEIGHT: 62 IN | WEIGHT: 154 LBS

## 2025-02-11 PROCEDURE — 99212 OFFICE O/P EST SF 10 MIN: CPT

## 2025-02-11 PROCEDURE — 73562 X-RAY EXAM OF KNEE 3: CPT | Mod: LT

## 2025-03-14 ENCOUNTER — NON-APPOINTMENT (OUTPATIENT)
Age: 70
End: 2025-03-14

## 2025-04-14 NOTE — HISTORY OF PRESENT ILLNESS
[de-identified] : Estefania comes in today status post a medial femoral condyle and tibial plateau fracture of the left knee.   [facility]

## 2025-04-25 ENCOUNTER — NON-APPOINTMENT (OUTPATIENT)
Age: 70
End: 2025-04-25

## 2025-07-24 ENCOUNTER — RX RENEWAL (OUTPATIENT)
Age: 70
End: 2025-07-24

## 2025-08-18 ENCOUNTER — OFFICE (OUTPATIENT)
Dept: URBAN - METROPOLITAN AREA CLINIC 103 | Facility: CLINIC | Age: 70
Setting detail: OPHTHALMOLOGY
End: 2025-08-18
Payer: MEDICARE

## 2025-08-18 DIAGNOSIS — H35.313: ICD-10-CM

## 2025-08-18 DIAGNOSIS — H52.4: ICD-10-CM

## 2025-08-18 DIAGNOSIS — H25.13: ICD-10-CM

## 2025-08-18 PROCEDURE — 92015 DETERMINE REFRACTIVE STATE: CPT

## 2025-08-18 PROCEDURE — 92004 COMPRE OPH EXAM NEW PT 1/>: CPT

## 2025-08-18 PROCEDURE — 92250 FUNDUS PHOTOGRAPHY W/I&R: CPT

## 2025-08-18 ASSESSMENT — CONFRONTATIONAL VISUAL FIELD TEST (CVF)
OS_FINDINGS: FULL
OD_FINDINGS: FULL

## 2025-08-18 ASSESSMENT — TONOMETRY
OS_IOP_MMHG: 18
OD_IOP_MMHG: 19

## 2025-08-19 ASSESSMENT — KERATOMETRY
OS_AXISANGLE_DEGREES: 000
OS_K1POWER_DIOPTERS: 43.50
OD_K2POWER_DIOPTERS: 43.00
OD_K1POWER_DIOPTERS: 42.00
OD_AXISANGLE_DEGREES: 002
OS_K2POWER_DIOPTERS: 44.00

## 2025-08-19 ASSESSMENT — REFRACTION_MANIFEST
OD_SPHERE: +0.25
OS_CYLINDER: -1.50
OD_ADD: +2.50
OS_ADD: +2.50
OD_VA1: 20/20
OS_VA1: 20/20
OS_SPHERE: -1.00
OD_AXIS: 090
OD_CYLINDER: -2.00
OS_AXIS: 100

## 2025-08-19 ASSESSMENT — VISUAL ACUITY
OS_BCVA: 20/30-2
OD_BCVA: 20/60-1

## 2025-08-19 ASSESSMENT — REFRACTION_CURRENTRX
OD_ADD: +2.50
OS_AXIS: 091
OS_OVR_VA: 20/
OS_CYLINDER: -1.50
OD_AXIS: 085
OD_SPHERE: -0.25
OS_SPHERE: -0.25
OD_OVR_VA: 20/
OD_CYLINDER: -1.25
OS_ADD: +2.50

## 2025-08-19 ASSESSMENT — REFRACTION_AUTOREFRACTION
OS_AXIS: 101
OD_AXIS: 091
OS_CYLINDER: -1.50
OD_SPHERE: PL
OD_CYLINDER: -2.00
OS_SPHERE: -1.00

## 2025-09-01 ENCOUNTER — NON-APPOINTMENT (OUTPATIENT)
Age: 70
End: 2025-09-01

## 2025-09-02 ENCOUNTER — APPOINTMENT (OUTPATIENT)
Dept: ORTHOPEDIC SURGERY | Facility: CLINIC | Age: 70
End: 2025-09-02
Payer: MEDICARE

## 2025-09-02 VITALS
HEART RATE: 68 BPM | BODY MASS INDEX: 28.34 KG/M2 | SYSTOLIC BLOOD PRESSURE: 126 MMHG | WEIGHT: 154 LBS | HEIGHT: 62 IN | DIASTOLIC BLOOD PRESSURE: 74 MMHG | OXYGEN SATURATION: 99 %

## 2025-09-02 PROCEDURE — G2211 COMPLEX E/M VISIT ADD ON: CPT

## 2025-09-02 PROCEDURE — 73562 X-RAY EXAM OF KNEE 3: CPT | Mod: LT

## 2025-09-02 PROCEDURE — 99213 OFFICE O/P EST LOW 20 MIN: CPT

## 2025-09-04 ENCOUNTER — NON-APPOINTMENT (OUTPATIENT)
Age: 70
End: 2025-09-04

## (undated) DEVICE — NDL HYPO SAFE 18G X 1.5" (PINK)

## (undated) DEVICE — DRSG DERMABOND 0.7ML

## (undated) DEVICE — MAKO CHECKPOINT KIT FEMORAL / TIBIAL

## (undated) DEVICE — MAKO STRYKER SILICONE RETRACTOR CORD

## (undated) DEVICE — MAKO BLADE STANDARD

## (undated) DEVICE — DRSG MEPILEX 10 X 25CM (4 X 10") POST OP AG SILVER

## (undated) DEVICE — DRAPE 1/2 SHEET 40X57"

## (undated) DEVICE — SPECIMEN CONTAINER 4OZ

## (undated) DEVICE — ELCTR GROUNDING PAD ADULT COVIDIEN

## (undated) DEVICE — SOL IRR BAG NS 0.9% 3000ML

## (undated) DEVICE — HOOD FLYTE STRYKER SURGICOOL W PEELAWAY

## (undated) DEVICE — DRAPE STICKY U BLUE 60 X 84"

## (undated) DEVICE — DRAPE XL SHEET 77X98"

## (undated) DEVICE — HOOD T7 NON-PEELAWAY

## (undated) DEVICE — URETERAL CATH RED RUBBER 16FR (ORANGE)

## (undated) DEVICE — ZIMMER PULSAVAC PLUS FAN KIT

## (undated) DEVICE — WARMING BLANKET UPPER ADULT

## (undated) DEVICE — MAKO DRAPE KIT

## (undated) DEVICE — SUT VICRYL 2-0 27" CT-2 UNDYED

## (undated) DEVICE — TAPE SILK 3"

## (undated) DEVICE — BLADE SCALPEL SAFETYLOCK #15

## (undated) DEVICE — MAKO BLADE NARROW

## (undated) DEVICE — SUT MONOCRYL 3-0 27" PS-2 UNDYED

## (undated) DEVICE — SUT VICRYL PLUS 0 18" CT-1 UNDYED (POP-OFF)

## (undated) DEVICE — GLV COTTON LG STERILE

## (undated) DEVICE — SYR LUER LOK 30CC

## (undated) DEVICE — SLING ARM CHIEFTAIN MESH LARGE

## (undated) DEVICE — SOL IRR POUR NS 0.9% 1000ML

## (undated) DEVICE — SOL IRR POUR H2O 1000ML

## (undated) DEVICE — WOUND IRR SURGIPHOR

## (undated) DEVICE — SYR LUER LOK 10CC

## (undated) DEVICE — MAKO VIZADISC KNEE TRACKING KIT

## (undated) DEVICE — ZIMMER CEMENT MIXING SYSTEM COMPACT VACUUM

## (undated) DEVICE — DRAPE IOBAN 33" X 23"

## (undated) DEVICE — VENODYNE/SCD SLEEVE CALF MEDIUM

## (undated) DEVICE — ELCTR STRYKER NEPTUNE SMOKE EVACUATION PENCIL (GREEN)

## (undated) DEVICE — GLV 8.5 PROTEXIS (WHITE)

## (undated) DEVICE — PACK TOTAL KNEE

## (undated) DEVICE — POSITIONER STRYKER LEG

## (undated) DEVICE — NDL HYPO SAFE 20G X 1.5" (YELLOW)

## (undated) DEVICE — DRAPE 3/4 SHEET 52X76"